# Patient Record
Sex: FEMALE | Race: WHITE | Employment: UNEMPLOYED | URBAN - METROPOLITAN AREA
[De-identification: names, ages, dates, MRNs, and addresses within clinical notes are randomized per-mention and may not be internally consistent; named-entity substitution may affect disease eponyms.]

---

## 2017-01-13 RX ORDER — FLUCONAZOLE 150 MG/1
150 TABLET ORAL DAILY
Qty: 2 TAB | Refills: 0 | Status: SHIPPED | OUTPATIENT
Start: 2017-01-13 | End: 2017-01-14

## 2017-02-08 ENCOUNTER — OFFICE VISIT (OUTPATIENT)
Dept: FAMILY MEDICINE CLINIC | Age: 42
End: 2017-02-08

## 2017-02-08 VITALS
RESPIRATION RATE: 16 BRPM | HEIGHT: 62 IN | BODY MASS INDEX: 22.08 KG/M2 | SYSTOLIC BLOOD PRESSURE: 140 MMHG | WEIGHT: 120 LBS | HEART RATE: 86 BPM | DIASTOLIC BLOOD PRESSURE: 88 MMHG | OXYGEN SATURATION: 97 % | TEMPERATURE: 98.6 F

## 2017-02-08 DIAGNOSIS — E03.4 HYPOTHYROIDISM DUE TO ACQUIRED ATROPHY OF THYROID: ICD-10-CM

## 2017-02-08 DIAGNOSIS — I10 ESSENTIAL HYPERTENSION: Primary | ICD-10-CM

## 2017-02-08 DIAGNOSIS — Z51.81 ENCOUNTER FOR MEDICATION MONITORING: ICD-10-CM

## 2017-02-08 DIAGNOSIS — F51.01 PRIMARY INSOMNIA: ICD-10-CM

## 2017-02-08 DIAGNOSIS — Z72.0 TOBACCO USE: ICD-10-CM

## 2017-02-08 DIAGNOSIS — Z01.419 ENCOUNTER FOR CERVICAL PAP SMEAR WITH PELVIC EXAM: ICD-10-CM

## 2017-02-08 DIAGNOSIS — I10 ESSENTIAL HYPERTENSION: ICD-10-CM

## 2017-02-08 DIAGNOSIS — J44.9 CHRONIC OBSTRUCTIVE PULMONARY DISEASE, UNSPECIFIED COPD TYPE (HCC): ICD-10-CM

## 2017-02-08 RX ORDER — CEFDINIR 300 MG/1
CAPSULE ORAL
Refills: 0 | COMMUNITY
Start: 2016-11-01 | End: 2017-02-08 | Stop reason: ALTCHOICE

## 2017-02-08 RX ORDER — BENZONATATE 100 MG/1
100 CAPSULE ORAL
Qty: 90 CAP | Refills: 1 | Status: SHIPPED | OUTPATIENT
Start: 2017-02-08 | End: 2017-05-31 | Stop reason: SDUPTHER

## 2017-02-08 RX ORDER — ALPRAZOLAM 0.5 MG/1
0.5 TABLET ORAL
Qty: 30 TAB | Refills: 1 | Status: SHIPPED | OUTPATIENT
Start: 2017-02-08 | End: 2017-04-11 | Stop reason: SDUPTHER

## 2017-02-08 RX ORDER — HYDROCHLOROTHIAZIDE 25 MG/1
25 TABLET ORAL DAILY
Qty: 90 TAB | Refills: 3 | Status: SHIPPED | OUTPATIENT
Start: 2017-02-08 | End: 2017-02-08 | Stop reason: SDUPTHER

## 2017-02-08 RX ORDER — TIOTROPIUM BROMIDE INHALATION SPRAY 3.12 UG/1
SPRAY, METERED RESPIRATORY (INHALATION)
Refills: 5 | COMMUNITY
Start: 2017-01-18

## 2017-02-08 RX ORDER — PROMETHAZINE HYDROCHLORIDE 25 MG/1
25 TABLET ORAL
Qty: 30 TAB | Refills: 1 | Status: SHIPPED | OUTPATIENT
Start: 2017-02-08

## 2017-02-08 RX ORDER — HYDROCHLOROTHIAZIDE 25 MG/1
25 TABLET ORAL DAILY
Qty: 90 TAB | Refills: 3 | Status: SHIPPED | OUTPATIENT
Start: 2017-02-08 | End: 2017-08-01 | Stop reason: SDUPTHER

## 2017-02-08 NOTE — PROGRESS NOTES
HISTORY OF PRESENT ILLNESS  Cachorro Tan is a 38 y.o. female. HPI   For follow up on BP and COPD/asthma. Has wheezing and chronic cough. Still smoking. Has tried cutting back and still working on reducing amount. Seeing endo for PCOS and thyroid. Also being followed for abnormal liver enzymes levels. Just completed a 30 day alcohol rehab program in [place]. States that she still drinks an occasional glass of wine with dinner but knows when to stop. Having trouble sleeping since recnet separation from her . Smoking still quite a bit to 3 ppd. Seeing a pulmonologist now. Also seeing hematology for hemachromatosis and elevated LFTS. Cardiovascular Review:  The patient has hypertension. Diet and Lifestyle: generally follows a low fat low cholesterol diet, generally follows a low sodium diet  Home BP Monitoring: is not measured at home. Pertinent ROS: taking medications as instructed, no medication side effects noted, no TIA's, no chest pain on exertion, no dyspnea on exertion, no swelling of ankles. Asthma Review:  The patient is being seen for follow up of asthma and tobacco abuse, not currently in exacerbation. Asthma symptoms occur: daily. Wheezing when present is described as mild-moderate and easily relieved with rescue bronchodilator. Current limitations in activity from asthma: none. Number of days of school or work missed in the last month: 0. Frequency of use of quick-relief meds: daily. Regimen compliance: The patient reports adherence to this regimen. Patient does smoke cigarettes. Migraine headaches are overall improved. Only having headaches about 2-3 times in a month.       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 90 Cap 1    promethazine (PHENERGAN) 25 mg tablet Take 1 Tab by mouth every six (6) hours as needed for Nausea. 30 Tab 1    ALPRAZolam (XANAX) 0.5 mg tablet Take 1 Tab by mouth nightly as needed. Max Daily Amount: 0.5 mg. 30 Tab 1    traZODone (DESYREL) 50 mg tablet Take 1 Tab by mouth nightly. 30 Tab 1    albuterol (VENTOLIN HFA) 90 mcg/actuation inhaler TAKE 2 PUFFS BY MOUTH EVERY SIX (6) HOURS AS NEEDED FOR WHEEZING. 1 Inhaler 3    levothyroxine (SYNTHROID) 100 mcg tablet Take 100 mcg by mouth Daily (before breakfast).  metFORMIN ER (GLUCOPHAGE XR) 500 mg tablet Take 2 Tabs by mouth two (2) times a day. 30 Tab     ergocalciferol (VITAMIN D2) 50,000 unit capsule Take 50,000 Units by mouth every seven (7) days.  ibuprofen (ADVIL) 200 mg tablet Take 200 mg by mouth every six (6) hours as needed.  diphenhydrAMINE (BENADRYL) 25 mg capsule Take 25 mg by mouth as needed.  hydroCHLOROthiazide (HYDRODIURIL) 25 mg tablet Take 1 Tab by mouth daily.  90 Tab 3       Allergies   Allergen Reactions    Morphine Itching     Pt states she is not allergic to Morphine    Nsaids (Non-Steroidal Anti-Inflammatory Drug) Other (comments)     NOT SUPPOSED TO TAKE DUE TO NISSIN PROCEDURE    Sulfa (Sulfonamide Antibiotics) Unknown (comments)       Past Medical History   Diagnosis Date    Asthma      USES INHALER    Brain tumor ([facility] [address]      noticed at 30 y/o - told benign, followed with MRI every 3 years and not changing    Chronic obstructive pulmonary disease ([address] 10/2016    Contact dermatitis and other eczema, due to unspecified cause     Fatty liver     Hernia hiatal     Hyperinsulinemia     Hypertension      on meds    Ill-defined condition      hemochromatosis-has not been treated as of 2/19/16    Insomnia     Migraine headache, common     Nausea & vomiting     Other ill-defined conditions(799.89)      has hiatal hernia    Other ill-defined conditions(799.89)      has migraines/said she was told she has a brain tumor    Other ill-defined conditions(799.89) 8/27/13     RECENT SINUS INFECTION    PCOD (polycystic ovarian disease)     Thyroid disease      HYPO       Past Surgical History   Procedure Laterality Date    Pr esophagogastric fundoplasty  2003 and 2004     x 2    Hx tonsil and adenoidectomy      Pr musc/tendon repair each; arm/elbow  1983    Hx cyst removal  1988     throat    Pr breast surgery procedure unlisted       left lumpectomy    Hx other surgical       sebacous cyst removal left axilla    Pr abdomen surgery proc unlisted  2009      abdominal liposuction    Hx heent       T & A child    Hx heent       cyst on front of neck removed age15    Hx orthopaedic       left hand tendon repaired age 11   Nathalia Susanne Hx orthopaedic  2008     LEFT FOOT SURGERY-NERVE REMOVED    Hx gyn       benign tumor labia    Hx gi  2003     nissen fundoplication with revision in 2005       Family History   Problem Relation Age of Onset    Hypertension Mother    Nathalia Susanne Arthritis-rheumatoid Mother     Hypertension Father     Cancer Father      prostate    Cancer Maternal Grandfather      esophageal    Stroke Paternal Grandfather        Social History   Substance Use Topics    Smoking status: Current Every Day Smoker     Packs/day: 0.75     Years: 18.00    Smokeless tobacco: Never Used    Alcohol use Yes      Comment: 1 glass per week        Review of Systems   Constitutional: Negative for malaise/fatigue. HENT: Negative for congestion. Eyes: Negative for blurred vision. Respiratory: Positive for cough and wheezing. Negative for shortness of breath. Cardiovascular: Negative for chest pain, palpitations and leg swelling. Gastrointestinal: Negative for abdominal pain, constipation and heartburn. Genitourinary: Negative for dysuria, frequency and urgency. Musculoskeletal: Negative for back pain and joint pain. Neurological: Negative for dizziness, tingling and headaches. Endo/Heme/Allergies: Negative for environmental allergies. Psychiatric/Behavioral: Negative for depression. The patient has insomnia. Physical Exam   Constitutional: She appears well-developed and well-nourished. /88  Pulse 86  Temp 98.6 °F (37 °C) (Oral)   Resp 16  Ht 5' 2\" (1.575 m)  Wt 120 lb (54.4 kg)  LMP 02/01/2017  SpO2 97%  BMI 21.95 kg/m2     HENT:   Right Ear: Tympanic membrane and ear canal normal.   Left Ear: Tympanic membrane and ear canal normal.   Nose: No mucosal edema or rhinorrhea. Mouth/Throat: Oropharynx is clear and moist and mucous membranes are normal.   Neck: Normal range of motion. Neck supple. No thyromegaly present. Cardiovascular: Normal rate and regular rhythm. No murmur heard. Pulmonary/Chest: Effort normal. She has wheezes. She has rhonchi. Abdominal: Soft. Bowel sounds are normal. There is no tenderness. Musculoskeletal: Normal range of motion. She exhibits no edema. Lymphadenopathy:     She has no cervical adenopathy. Skin: Skin is warm and dry. Psychiatric: She has a normal mood and affect. Nursing note and vitals reviewed. ASSESSMENT and Elizabeth Valero was seen today for hypertension. Diagnoses and all orders for this visit:    Essential hypertension  -  Refill hydroCHLOROthiazide (HYDRODIURIL) 25 mg tablet; Take 1 Tab by mouth daily.  -     LIPID PANEL; Future    Chronic obstructive pulmonary disease, unspecified COPD type (HCC)//Asthma  -     benzonatate (TESSALON PERLES) 100 mg capsule;  Take 1 Cap by mouth three (3) times daily as needed for Cough. Hypothyroidism due to acquired atrophy of thyroid  As per endo    Primary insomnia  -     Refill ALPRAZolam (XANAX) 0.5 mg tablet; Take 1 Tab by mouth nightly as needed. Max Daily Amount: 0.5 mg. Pt was advise that she could not drink if using this medication to help charlie sleep. Encounter for medication monitoring  -     METABOLIC PANEL, COMPREHENSIVE; Future    Encounter for cervical Pap smear with pelvic exam  -     REFERRAL TO GYNECOLOGY    Tobacco use  The patient was counseled on the dangers of tobacco use, and was advised to quit. Reviewed strategies to maximize success, including removing cigarettes and smoking materials from environment, stress management, substitution of other forms of reinforcement. Other orders  -     promethazine (PHENERGAN) 25 mg tablet; Take 1 Tab by mouth every six (6) hours as needed for Nausea. Follow-up Disposition:  Return in about 5 months (around 7/8/2017). reviewed diet, exercise and weight control  very strongly urged to quit smoking to reduce cardiovascular risk  cardiovascular risk and specific lipid/LDL goals reviewed  reviewed medications and side effects in detail    I have discussed diagnosis listed in this note with pt and/or family. I have discussed treatment plans and options and the risk/benefit analysis of those options, including safe use of medications and possible medication side effects. Through the use of shared decision making we have agreed to the above plan. The patient has received an after-visit summary and questions were answered concerning future plans and follow up. Advise pt of any urgent changes then to proceed to the ER.

## 2017-02-08 NOTE — MR AVS SNAPSHOT
Visit Information Date & Time Provider Department Dept. Phone Encounter #  
 2/8/2017 11:00 AM Blayne Colunga [place] [phone] [id number] Follow-up Instructions Return in about 5 months (around 7/8/2017). Your Appointments 7/10/2017 11:00 AM  
ROUTINE CARE with Blayne Colunga MD  
[facility]) Appt Note: 5m f/u  
 [address] [facility] [address] [phone] [address] [place] [address] [address] Upcoming Health Maintenance Date Due  
 PAP AKA CERVICAL CYTOLOGY 7/15/2016 DTaP/Tdap/Td series (2 - Td) 8/9/2023 Allergies as of 2/8/2017  Review Complete On: 2/8/2017 By: Blayne Colunga MD  
  
 Severity Noted Reaction Type Reactions Morphine  02/19/2010    Itching Pt states she is not allergic to Morphine Nsaids (Non-steroidal Anti-inflammatory Drug)  09/12/2011    Other (comments) NOT SUPPOSED TO TAKE DUE TO NISSIN PROCEDURE Sulfa (Sulfonamide Antibiotics)  02/19/2010    Unknown (comments) Current Immunizations  Reviewed on 2/8/2017 Name Date  
 TB Skin Test (PPD) Intradermal 9/4/2013 Tdap 8/9/2013 Reviewed by Blayne Colunga MD on 2/8/2017 at 11:22 AM  
You Were Diagnosed With   
  
 Codes Comments Essential hypertension    -  Primary ICD-10-CM: I10 
ICD-9-CM: 397. 9 Wheezing     ICD-10-CM: R06.2 ICD-9-CM: 786.07 Hypothyroidism due to acquired atrophy of thyroid     ICD-10-CM: E03.4 ICD-9-CM: 244.8, 246.8 Tobacco use     ICD-10-CM: Z72.0 ICD-9-CM: 305.1 Encounter for medication monitoring     ICD-10-CM: Z51.81 
ICD-9-CM: V58.83 Encounter for cervical Pap smear with pelvic exam     ICD-10-CM: U98.710 ICD-9-CM: V76.2, V72.31 Primary insomnia     ICD-10-CM: F51.01 
ICD-9-CM: 307.42 Vitals BP Pulse Temp Resp Height(growth percentile) Weight(growth percentile) 140/88 86 98.6 °F (37 °C) (Oral) 16 5' 2\" (1.575 m) 120 lb (54.4 kg) LMP SpO2 BMI OB Status Smoking Status 02/01/2017 97% 21.95 kg/m2 Having regular periods Current Every Day Smoker Vitals History BMI and BSA Data Body Mass Index Body Surface Area  
 21.95 kg/m 2 1.54 m 2 Preferred Pharmacy Pharmacy Name Phone [facility][id number]- [address], [address] [phone] Your Updated Medication List  
  
   
This list is accurate as of: 2/8/17  6:11 PM.  Always use your most recent med list. ADVIL 200 mg tablet Generic drug:  ibuprofen Take 200 mg by mouth every six (6) hours as needed. albuterol 90 mcg/actuation inhaler Commonly known as:  VENTOLIN HFA  
TAKE 2 PUFFS BY MOUTH EVERY SIX (6) HOURS AS NEEDED FOR WHEEZING. ALPRAZolam 0.5 mg tablet Commonly known as:  Carletha Puller Take 1 Tab by mouth nightly as needed. Max Daily Amount: 0.5 mg. BENADRYL 25 mg capsule Generic drug:  diphenhydrAMINE Take 25 mg by mouth as needed. benzonatate 100 mg capsule Commonly known as:  TESSALON PERLES Take 1 Cap by mouth three (3) times daily as needed for Cough. hydroCHLOROthiazide 25 mg tablet Commonly known as:  HYDRODIURIL Take 1 Tab by mouth daily. metFORMIN  mg tablet Commonly known as:  GLUCOPHAGE XR Take 2 Tabs by mouth two (2) times a day. promethazine 25 mg tablet Commonly known as:  PHENERGAN Take 1 Tab by mouth every six (6) hours as needed for Nausea. SPIRIVA RESPIMAT 2.5 mcg/actuation inhaler Generic drug:  tiotropium bromide INHALE 2 PUFFS DAILY  
  
 SYNTHROID 100 mcg tablet Generic drug:  levothyroxine Take 100 mcg by mouth Daily (before breakfast). traZODone 50 mg tablet Commonly known as:  Tyesha Quach Take 1 Tab by mouth nightly. VITAMIN D2 50,000 unit capsule Generic drug:  ergocalciferol Take 50,000 Units by mouth every seven (7) days. Prescriptions Printed Refills  
 promethazine (PHENERGAN) 25 mg tablet 1 Sig: Take 1 Tab by mouth every six (6) hours as needed for Nausea. Class: Print Route: Oral  
 ALPRAZolam (XANAX) 0.5 mg tablet 1 Sig: Take 1 Tab by mouth nightly as needed. Max Daily Amount: 0.5 mg.  
 Class: Print Route: Oral  
  
Prescriptions Sent to Pharmacy Refills  
 hydroCHLOROthiazide (HYDRODIURIL) 25 mg tablet (Discontinued) 3 Sig: Take 1 Tab by mouth daily. Class: Normal  
 Pharmacy: [address] [place] [address] Ph #: [phone] Route: Oral  
 Reason for Discontinue: Reorder  
 benzonatate (TESSALON PERLES) 100 mg capsule 1 Sig: Take 1 Cap by mouth three (3) times daily as needed for Cough. Class: Normal  
 Pharmacy: [address] [place] [address] Ph #: [phone] Route: Oral  
  
We Performed the Following REFERRAL TO GYNECOLOGY [REF30 Custom] Comments:  
 . Follow-up Instructions Return in about 5 months (around 7/8/2017). To-Do List   
 02/08/2017 Lab:  LIPID PANEL   
  
 02/08/2017 Lab:  METABOLIC PANEL, COMPREHENSIVE Referral Information Referral ID Referred By Referred To  
  
 [id number] [address] Meg Guzman MD   
   [address] [place] [address] Phone: [phone] Fax: [phone] Visits Status Start Date End Date 1 New Request 2/8/17 2/8/18 If your referral has a status of pending review or denied, additional information will be sent to support the outcome of this decision. Patient Instructions Breast Self-Exam: Care Instructions Your Care Instructions A breast self-exam is when you check your breasts for lumps or changes. This regular exam helps you learn how your breasts normally look and feel. Most breast problems or changes are not because of cancer. Breast self-exam is not a substitute for a mammogram. Having regular breast exams by your doctor and regular mammograms improve your chances of finding any problems with your breasts. Some women set a time each month to do a step-by-step breast self-exam. Other women like a less formal system. They might look at their breasts as they brush their teeth, or feel their breasts once in a while in the shower. If you notice a change in your breast, tell your doctor. Follow-up care is a key part of your treatment and safety. Be sure to make and go to all appointments, and call your doctor if you are having problems. Its also a good idea to know your test results and keep a list of the medicines you take. How do you do a breast self-exam? 
· The best time to examine your breasts is usually one week after your menstrual period begins. Your breasts should not be tender then. If you do not have periods, you might do your exam on a day of the month that is easy to remember. · To examine your breasts: ¨ Remove all your clothes above the waist and lie down. When you are lying down, your breast tissue spreads evenly over your chest wall, which makes it easier to feel all your breast tissue. ¨ Use the padsnot the fingertipsof the 3 middle fingers of your left hand to check your right breast. Move your fingers slowly in small coin-sized circles that overlap. ¨ Use three levels of pressure to feel of all your breast tissue. Use light pressure to feel the tissue close to the skin surface. Use medium pressure to feel a little deeper. Use firm pressure to feel your tissue close to your breastbone and ribs. Use each pressure level to feel your breast tissue before moving on to the next spot. ¨ Check your entire breast, moving up and down as if following a strip from the collarbone to the bra line, and from the armpit to the ribs.  Repeat until you have covered the entire breast. 
 ¨ Repeat this procedure for your left breast, using the pads of the 3 middle fingers of your right hand. · To examine your breasts while in the shower: 
¨ Place one arm over your head and lightly soap your breast on that side. ¨ Using the pads of your fingers, gently move your hand over your breast (in the strip pattern described above), feeling carefully for any lumps or changes. ¨ Repeat for the other breast. 
· Have your doctor inspect anything you notice to see if you need further testing. Where can you learn more? Go to http://annette-anish.info/. Enter P148 in the search box to learn more about \"Breast Self-Exam: Care Instructions. \" Current as of: July 26, 2016 Content Version: 11.1 © 7543-1016 [employer]orporated. Care instructions adapted under license by [employer] (which disclaims liability or warranty for this information). If you have questions about a medical condition or this instruction, always ask your healthcare professional. [place] [address] any warranty or liability for your use of this information. Introducing [facility] HE[facility]! Patricia Allen introduces MeMeMe patient portal. Now you can access parts of your medical record, email your doctor's office, and request medication refills online. 1. In your internet browser, go to [email or web address] 2. Click on the First Time User? Click Here link in the Sign In box. You will see the New Member Sign Up page. 3. Enter your MeMeMe Access Code exactly as it appears below. You will not need to use this code after youve completed the sign-up process. If you do not sign up before the expiration date, you must request a new code. · MeMeMe Access Code: [email or web address] Expires: 5/9/2017 11:51 AM 
 
4. Enter the last four digits of your Social Security Number (xxxx) and Date of Birth (mm/dd/yyyy) as indicated and click Submit.  You will be taken to the next sign-up page. 5. Create a [employer] ID. This will be your [employer] login ID and cannot be changed, so think of one that is secure and easy to remember. 6. Create a [employer] password. You can change your password at any time. 7. Enter your Password Reset Question and Answer. This can be used at a later time if you forget your password. 8. Enter your e-mail address. You will receive e-mail notification when new information is available in [address]. 9. Click Sign Up. You can now view and download portions of your medical record. 10. Click the Download Summary menu link to download a portable copy of your medical information. If you have questions, please visit the Frequently Asked Questions section of the [employer] website. Remember, [employer] is NOT to be used for urgent needs. For medical emergencies, dial 911. Now available from your iPhone and Android! Please provide this summary of care documentation to your next provider. Your primary care clinician is listed as Peter Leroy. If you have any questions after today's visit, please call [phone].

## 2017-02-08 NOTE — PATIENT INSTRUCTIONS
Breast Self-Exam: Care Instructions  Your Care Instructions  A breast self-exam is when you check your breasts for lumps or changes. This regular exam helps you learn how your breasts normally look and feel. Most breast problems or changes are not because of cancer. Breast self-exam is not a substitute for a mammogram. Having regular breast exams by your doctor and regular mammograms improve your chances of finding any problems with your breasts. Some women set a time each month to do a step-by-step breast self-exam. Other women like a less formal system. They might look at their breasts as they brush their teeth, or feel their breasts once in a while in the shower. If you notice a change in your breast, tell your doctor. Follow-up care is a key part of your treatment and safety. Be sure to make and go to all appointments, and call your doctor if you are having problems. Its also a good idea to know your test results and keep a list of the medicines you take. How do you do a breast self-exam?  · The best time to examine your breasts is usually one week after your menstrual period begins. Your breasts should not be tender then. If you do not have periods, you might do your exam on a day of the month that is easy to remember. · To examine your breasts:  ¨ Remove all your clothes above the waist and lie down. When you are lying down, your breast tissue spreads evenly over your chest wall, which makes it easier to feel all your breast tissue. ¨ Use the padsnot the fingertipsof the 3 middle fingers of your left hand to check your right breast. Move your fingers slowly in small coin-sized circles that overlap. ¨ Use three levels of pressure to feel of all your breast tissue. Use light pressure to feel the tissue close to the skin surface. Use medium pressure to feel a little deeper. Use firm pressure to feel your tissue close to your breastbone and ribs.  Use each pressure level to feel your breast tissue before moving on to the next spot. ¨ Check your entire breast, moving up and down as if following a strip from the collarbone to the bra line, and from the armpit to the ribs. Repeat until you have covered the entire breast.  ¨ Repeat this procedure for your left breast, using the pads of the 3 middle fingers of your right hand. · To examine your breasts while in the shower:  ¨ Place one arm over your head and lightly soap your breast on that side. ¨ Using the pads of your fingers, gently move your hand over your breast (in the strip pattern described above), feeling carefully for any lumps or changes. ¨ Repeat for the other breast.  · Have your doctor inspect anything you notice to see if you need further testing. Where can you learn more? Go to http://annette-anish.info/. Enter P148 in the search box to learn more about \"Breast Self-Exam: Care Instructions. \"  Current as of: July 26, 2016  Content Version: 11.1  © 6581-1289 [employer]. Care instructions adapted under license by [employer] (which disclaims liability or warranty for this information). If you have questions about a medical condition or this instruction, always ask your healthcare professional. [place] [address] any warranty or liability for your use of this information.

## 2017-02-27 ENCOUNTER — HOSPITAL ENCOUNTER (OUTPATIENT)
Dept: CT IMAGING | Age: 42
Discharge: HOME | End: 2017-02-27
Attending: INTERNAL MEDICINE
Payer: COMMERCIAL

## 2017-02-27 DIAGNOSIS — R93.89 ABNORMAL RADIOLOGICAL FINDINGS IN SKIN AND SUBCUTANEOUS TISSUE: ICD-10-CM

## 2017-02-27 PROCEDURE — 71250 CT THORAX DX C-: CPT

## 2017-03-16 ENCOUNTER — DOCUMENTATION ONLY (OUTPATIENT)
Dept: FAMILY MEDICINE CLINIC | Age: 42
End: 2017-03-16

## 2017-04-11 DIAGNOSIS — F51.01 PRIMARY INSOMNIA: ICD-10-CM

## 2017-04-14 RX ORDER — ALPRAZOLAM 0.5 MG/1
0.5 TABLET ORAL
Qty: 30 TAB | Refills: 1 | OUTPATIENT
Start: 2017-04-14 | End: 2017-07-05 | Stop reason: SDUPTHER

## 2017-05-31 DIAGNOSIS — J44.9 CHRONIC OBSTRUCTIVE PULMONARY DISEASE, UNSPECIFIED COPD TYPE (HCC): ICD-10-CM

## 2017-05-31 RX ORDER — BENZONATATE 100 MG/1
100 CAPSULE ORAL
Qty: 90 CAP | Refills: 1 | Status: SHIPPED | OUTPATIENT
Start: 2017-05-31

## 2017-07-05 DIAGNOSIS — F51.01 PRIMARY INSOMNIA: ICD-10-CM

## 2017-07-05 RX ORDER — ALPRAZOLAM 0.5 MG/1
0.5 TABLET ORAL
Qty: 30 TAB | Refills: 0 | OUTPATIENT
Start: 2017-07-05 | End: 2017-08-01 | Stop reason: SDUPTHER

## 2017-07-07 ENCOUNTER — DOCUMENTATION ONLY (OUTPATIENT)
Dept: FAMILY MEDICINE CLINIC | Age: 42
End: 2017-07-07

## 2017-07-07 NOTE — PROGRESS NOTES
Alprazolam 0.5 mg tablet called to [facility] pharmacy #[id number] [place], [place]. Disp 30 refill zero. Take one by mouth nightly as needed. Authorized by Dr. Jose Downing.

## 2017-07-11 ENCOUNTER — OFFICE VISIT (OUTPATIENT)
Dept: FAMILY MEDICINE CLINIC | Age: 42
End: 2017-07-11

## 2017-07-11 NOTE — MR AVS SNAPSHOT
Visit Information Date & Time Provider Department Dept. Phone Encounter #  
 7/11/2017 11:00 AM Reyes Todd [phone] [id number] Your Appointments 8/1/2017 11:45 AM  
ROUTINE CARE with Dinora Leiva MD  
[facility] [address]) Appt Note: follow up  
 [address] [place] [address] [phone] [address] Upcoming Health Maintenance Date Due  
 PAP AKA CERVICAL CYTOLOGY 7/15/2016 INFLUENZA AGE 9 TO ADULT 8/1/2017 DTaP/Tdap/Td series (2 - Td) 8/9/2023 Allergies as of 7/11/2017  Review Complete On: 7/11/2017 By: Dinora Leiva MD  
  
 Severity Noted Reaction Type Reactions Morphine  02/19/2010    Itching Pt states she is not allergic to Morphine Nsaids (Non-steroidal Anti-inflammatory Drug)  09/12/2011    Other (comments) NOT SUPPOSED TO TAKE DUE TO NISSIN PROCEDURE Sulfa (Sulfonamide Antibiotics)  02/19/2010    Unknown (comments) Current Immunizations  Reviewed on 2/8/2017 Name Date  
 TB Skin Test (PPD) Intradermal 9/4/2013 Tdap 8/9/2013 Not reviewed this visit Vitals OB Status Smoking Status Having regular periods Current Every Day Smoker Preferred Pharmacy Pharmacy Name Phone [id number], [address] [phone] Your Updated Medication List  
  
   
This list is accurate as of: 7/11/17  6:29 PM.  Always use your most recent med list. ADVIL 200 mg tablet Generic drug:  ibuprofen Take 200 mg by mouth every six (6) hours as needed. albuterol 90 mcg/actuation inhaler Commonly known as:  VENTOLIN HFA  
TAKE 2 PUFFS BY MOUTH EVERY SIX (6) HOURS AS NEEDED FOR WHEEZING. ALPRAZolam 0.5 mg tablet Commonly known as:  Pearl Ibarra Take 1 Tab by mouth nightly as needed. Max Daily Amount: 0.5 mg. BENADRYL 25 mg capsule Generic drug:  diphenhydrAMINE Take 25 mg by mouth as needed. benzonatate 100 mg capsule Commonly known as:  TESSALON PERLES Take 1 Cap by mouth three (3) times daily as needed for Cough. hydroCHLOROthiazide 25 mg tablet Commonly known as:  HYDRODIURIL Take 1 Tab by mouth daily. metFORMIN  mg tablet Commonly known as:  GLUCOPHAGE XR Take 2 Tabs by mouth two (2) times a day. promethazine 25 mg tablet Commonly known as:  PHENERGAN Take 1 Tab by mouth every six (6) hours as needed for Nausea. SPIRIVA RESPIMAT 2.5 mcg/actuation inhaler Generic drug:  tiotropium bromide INHALE 2 PUFFS DAILY  
  
 SYNTHROID 100 mcg tablet Generic drug:  levothyroxine Take 100 mcg by mouth Daily (before breakfast). traZODone 50 mg tablet Commonly known as:  Georganna Quiver Take 1 Tab by mouth nightly. VITAMIN D2 50,000 unit capsule Generic drug:  ergocalciferol Take 50,000 Units by mouth every seven (7) days. Introducing [facility]! Kris Wade introduces Demeure patient portal. Now you can access parts of your medical record, email your doctor's office, and request medication refills online. 1. In your internet browser, go to [email or web address] 2. Click on the First Time User? Click Here link in the Sign In box. You will see the New Member Sign Up page. 3. Enter your Demeure Access Code exactly as it appears below. You will not need to use this code after youve completed the sign-up process. If you do not sign up before the expiration date, you must request a new code. · Demeure Access Code: [email or web address] Expires: 10/9/2017  6:29 PM 
 
4. Enter the last four digits of your Social Security Number (xxxx) and Date of Birth (mm/dd/yyyy) as indicated and click Submit. You will be taken to the next sign-up page. 5. Create a Ovelin ID. This will be your Ovelin login ID and cannot be changed, so think of one that is secure and easy to remember. 6. Create a Ovelin password. You can change your password at any time. 7. Enter your Password Reset Question and Answer. This can be used at a later time if you forget your password. 8. Enter your e-mail address. You will receive e-mail notification when new information is available in [address] 9. Click Sign Up. You can now view and download portions of your medical record. 10. Click the Download Summary menu link to download a portable copy of your medical information. If you have questions, please visit the Frequently Asked Questions section of the Ovelin website. Remember, Ovelin is NOT to be used for urgent needs. For medical emergencies, dial 911. Now available from your iPhone and Android! Please provide this summary of care documentation to your next provider. Your primary care clinician is listed as Nate Greene. If you have any questions after today's visit, please call [phone].

## 2017-07-20 LAB — CREATININE, EXTERNAL: 1.08

## 2017-08-01 ENCOUNTER — OFFICE VISIT (OUTPATIENT)
Dept: FAMILY MEDICINE CLINIC | Age: 42
End: 2017-08-01

## 2017-08-01 VITALS
DIASTOLIC BLOOD PRESSURE: 101 MMHG | SYSTOLIC BLOOD PRESSURE: 163 MMHG | OXYGEN SATURATION: 99 % | BODY MASS INDEX: 22.86 KG/M2 | TEMPERATURE: 98.5 F | HEIGHT: 62 IN | WEIGHT: 124.2 LBS | HEART RATE: 100 BPM | RESPIRATION RATE: 16 BRPM

## 2017-08-01 DIAGNOSIS — F51.01 PRIMARY INSOMNIA: ICD-10-CM

## 2017-08-01 DIAGNOSIS — E03.4 HYPOTHYROIDISM DUE TO ACQUIRED ATROPHY OF THYROID: ICD-10-CM

## 2017-08-01 DIAGNOSIS — F10.11 H/O ALCOHOL ABUSE: ICD-10-CM

## 2017-08-01 DIAGNOSIS — Z51.81 ENCOUNTER FOR MEDICATION MONITORING: ICD-10-CM

## 2017-08-01 DIAGNOSIS — I10 ESSENTIAL HYPERTENSION: Primary | ICD-10-CM

## 2017-08-01 DIAGNOSIS — Z72.0 TOBACCO ABUSE: ICD-10-CM

## 2017-08-01 DIAGNOSIS — J45.40 RAD (REACTIVE AIRWAY DISEASE), MODERATE PERSISTENT, UNCOMPLICATED: ICD-10-CM

## 2017-08-01 PROBLEM — J45.909 RAD (REACTIVE AIRWAY DISEASE): Status: ACTIVE | Noted: 2017-08-01

## 2017-08-01 RX ORDER — LISINOPRIL 20 MG/1
20 TABLET ORAL DAILY
Qty: 90 TAB | Refills: 1 | Status: SHIPPED | OUTPATIENT
Start: 2017-08-01 | End: 2018-02-01 | Stop reason: SDUPTHER

## 2017-08-01 RX ORDER — TRAZODONE HYDROCHLORIDE 50 MG/1
50 TABLET ORAL
Qty: 30 TAB | Refills: 3 | Status: SHIPPED | OUTPATIENT
Start: 2017-08-01 | End: 2018-01-18 | Stop reason: SDUPTHER

## 2017-08-01 RX ORDER — HYDROCHLOROTHIAZIDE 25 MG/1
25 TABLET ORAL DAILY
Qty: 90 TAB | Refills: 3 | Status: SHIPPED | OUTPATIENT
Start: 2017-08-01 | End: 2018-08-03 | Stop reason: SDUPTHER

## 2017-08-01 RX ORDER — ALPRAZOLAM 0.5 MG/1
0.5 TABLET ORAL
Qty: 30 TAB | Refills: 3 | Status: SHIPPED | OUTPATIENT
Start: 2017-08-01 | End: 2018-01-18 | Stop reason: SDUPTHER

## 2017-08-01 NOTE — PROGRESS NOTES
HISTORY OF PRESENT ILLNESS  Vance Rodarte is a 38 y.o. female. HPI   For follow up on BP and COPD/asthma. Has wheezing and chronic cough. Still smoking 2-3ppd. Seeing endo for PCOS and thyroid. Also being followed for abnormal liver enzymes levels. Completed a 30 day alcohol rehab program in [place] but she has been drinking again due to stress. States that she is not drinking everyday. States that she knows when to stop. Having trouble sleeping since recent separation from her . Seeing a pulmonologist now. Also seeing hematology for hemachromatosis and elevated LFTS. Cardiovascular Review:  The patient has hypertension. Diet and Lifestyle: generally follows a low fat low cholesterol diet, generally follows a low sodium diet  Home BP Monitoring: is not measured at home. Pertinent ROS: taking medications as instructed, no medication side effects noted, no TIA's, no chest pain on exertion, no dyspnea on exertion, no swelling of ankles. Asthma Review:  The patient is being seen for follow up of asthma/COPD and tobacco abuse, not currently in exacerbation. Seeing pulmonologist.    Asthma symptoms occur: daily. Wheezing when present is described as mild-moderate and easily relieved with rescue bronchodilator. Current limitations in activity from asthma: none. Number of days of school or work missed in the last month: 0. Frequency of use of quick-relief meds: daily. Regimen compliance: The patient reports adherence to this regimen. Patient does smoke cigarettes. Migraine headaches are overall improved. Only having headaches about 2-3 times in a month.       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 Max Daily Amount: 0.5 mg. 30 Tab 3    traZODone (DESYREL) 50 mg tablet Take 1 Tab by mouth nightly. 30 Tab 3    lisinopril (PRINIVIL, ZESTRIL) 20 mg tablet Take 1 Tab by mouth daily. 90 Tab 1    hydroCHLOROthiazide (HYDRODIURIL) 25 mg tablet Take 1 Tab by mouth daily. 90 Tab 3    benzonatate (TESSALON PERLES) 100 mg capsule Take 1 Cap by mouth three (3) times daily as needed for Cough. 90 Cap 1    SPIRIVA RESPIMAT 2.5 mcg/actuation inhaler INHALE 2 PUFFS DAILY  5    promethazine (PHENERGAN) 25 mg tablet Take 1 Tab by mouth every six (6) hours as needed for Nausea. 30 Tab 1    albuterol (VENTOLIN HFA) 90 mcg/actuation inhaler TAKE 2 PUFFS BY MOUTH EVERY SIX (6) HOURS AS NEEDED FOR WHEEZING. 1 Inhaler 3    levothyroxine (SYNTHROID) 100 mcg tablet Take 100 mcg by mouth Daily (before breakfast).  metFORMIN ER (GLUCOPHAGE XR) 500 mg tablet Take 2 Tabs by mouth two (2) times a day. 30 Tab     ibuprofen (ADVIL) 200 mg tablet Take 200 mg by mouth every six (6) hours as needed.  diphenhydrAMINE (BENADRYL) 25 mg capsule Take 25 mg by mouth as needed.          Allergies   Allergen Reactions    Morphine Itching     Pt states she is not allergic to Morphine    Nsaids (Non-Steroidal Anti-Inflammatory Drug) Other (comments)     NOT SUPPOSED TO TAKE DUE TO NISSIN PROCEDURE    Sulfa (Sulfonamide Antibiotics) Unknown (comments)         Past Medical History:   Diagnosis Date    Asthma     USES INHALER    Brain tumor ([facility]     noticed at 30 y/o - told benign, followed with MRI every 3 years and not changing    Chronic obstructive pulmonary disease ([facility] 10/2016    Contact dermatitis and other eczema, due to unspecified cause     Fatty liver     Hernia hiatal     Hyperinsulinemia     Hypertension     on meds    Ill-defined condition     hemochromatosis-has not been treated as of 2/19/16    Insomnia     Migraine headache, common     Nausea & vomiting     Other ill-defined conditions     has hiatal hernia    Other ill-defined conditions     has migraines/said she was told she has a brain tumor    Other ill-defined conditions 8/27/13    RECENT SINUS INFECTION    PCOD (polycystic ovarian disease)     Thyroid disease     HYPO         Past Surgical History:   Procedure Laterality Date    ABDOMEN SURGERY PROC UNLISTED  2009     abdominal liposuction    BREAST SURGERY PROCEDURE UNLISTED      left lumpectomy    HX CYST REMOVAL  1988    throat    HX GI  2003    nissen fundoplication with revision in 2005    HX GYN      benign tumor labia    HX HEENT      T & A child    HX HEENT      cyst on front of neck removed age15    HX ORTHOPAEDIC      left hand tendon repaired age 11   [de-identified] ORTHOPAEDIC  2008    LEFT FOOT SURGERY-NERVE REMOVED    HX OTHER SURGICAL      sebacous cyst removal left axilla    HX TONSIL AND ADENOIDECTOMY      MUSC/TENDON REPAIR EACH; ARM/ELBOW  1983    AK ESOPHAGOGASTRIC FUNDOPLASTY  2003 and 2004    x 2         Family History   Problem Relation Age of Onset    Hypertension Mother    Catha Sprout Arthritis-rheumatoid Mother     Hypertension Father     Cancer Father      prostate    Cancer Maternal Grandfather      esophageal    Stroke Paternal Grandfather        Social History   Substance Use Topics    Smoking status: Current Every Day Smoker     Packs/day: 0.75     Years: 18.00    Smokeless tobacco: Never Used    Alcohol use Yes      Comment: 1 glass per week        Review of Systems   Constitutional: Negative for malaise/fatigue. HENT: Negative for congestion. Eyes: Negative for blurred vision. Respiratory: Positive for cough and wheezing. Negative for shortness of breath. Cardiovascular: Negative for chest pain, palpitations and leg swelling. Gastrointestinal: Negative for abdominal pain, constipation and heartburn. Genitourinary: Negative for dysuria, frequency and urgency. Musculoskeletal: Negative for back pain and joint pain. Neurological: Negative for dizziness, tingling and headaches. Endo/Heme/Allergies: Negative for environmental allergies. Psychiatric/Behavioral: Negative for depression. The patient has insomnia. Physical Exam   Constitutional: She appears well-developed and well-nourished. /88  Pulse 86  Temp 98.6 °F (37 °C) (Oral)   Resp 16  Ht 5' 2\" (1.575 m)  Wt 120 lb (54.4 kg)  LMP 02/01/2017  SpO2 97%  BMI 21.95 kg/m2     HENT:   Right Ear: Tympanic membrane and ear canal normal.   Left Ear: Tympanic membrane and ear canal normal.   Nose: No mucosal edema or rhinorrhea. Mouth/Throat: Oropharynx is clear and moist and mucous membranes are normal.   Neck: Normal range of motion. Neck supple. No thyromegaly present. Cardiovascular: Normal rate and regular rhythm. No murmur heard. Pulmonary/Chest: Effort normal. She has wheezes. She has rhonchi. Abdominal: Soft. Bowel sounds are normal. There is no tenderness. Musculoskeletal: Normal range of motion. She exhibits no edema. Lymphadenopathy:     She has no cervical adenopathy. Skin: Skin is warm and dry. Psychiatric: She has a normal mood and affect. Nursing note and vitals reviewed. ASSESSMENT and PLAN  Diagnoses and all orders for this visit:    1. Essential hypertension  Uncontrolled. Discussed sodium restriction, high k rich diet, maintaining ideal body weight and regular exercise program such as daily walking 30 min perday 4-5 times per week, as physiologic means to achieve blood pressure control.  Medication compliance advised. Discussed complications d/t uncontrolled HTN.     -     Add lisinopril (PRINIVIL, ZESTRIL) 20 mg tablet; Take 1 Tab by mouth daily. -     hydroCHLOROthiazide (HYDRODIURIL) 25 mg tablet; Take 1 Tab by mouth daily. 2. Primary insomnia  -     Refill ALPRAZolam (XANAX) 0.5 mg tablet; Take 1 Tab by mouth nightly as needed. Max Daily Amount: 0.5 mg.  -    Refill traZODone (DESYREL) 50 mg tablet; Take 1 Tab by mouth nightly. 3. H/O alcohol abuse  Discussed ETOH use. States that she will cut back on the drinking. Declined any treatment follow up and does not want to go to AA    4. Tobacco abuse  The patient was counseled on the dangers of tobacco use, and was advised to quit. 5. RAD (reactive airway disease), moderate persistent, uncomplicated//COPD  As per pulmonary. 6. Hypothyroidism due to acquired atrophy of thyroid  As per endo    7. Encounter for medication monitoring      Follow-up Disposition:  Return in about 6 weeks (around 9/12/2017). reviewed diet, exercise and weight control  cardiovascular risk and specific lipid/LDL goals reviewed  reviewed medications and side effects in detail    I have discussed diagnosis listed in this note with pt and/or family. I have discussed treatment plans and options and the risk/benefit analysis of those options, including safe use of medications and possible medication side effects. Through the use of shared decision making we have agreed to the above plan. The patient has received an after-visit summary and questions were answered concerning future plans and follow up. Advise pt of any urgent changes then to proceed to the ER.

## 2017-08-01 NOTE — MR AVS SNAPSHOT
Visit Information Date & Time Provider Department Dept. Phone Encounter #  
 8/1/2017 11:45 AM Olesya Sanchez MD [facility] [phone] [id number] Follow-up Instructions Return in about 6 weeks (around 9/12/2017). Upcoming Health Maintenance Date Due  
 PAP AKA CERVICAL CYTOLOGY 7/15/2016 DTaP/Tdap/Td series (2 - Td) 8/9/2023 Allergies as of 8/1/2017  Review Complete On: 8/1/2017 By: Olesya Sanchez MD  
  
 Severity Noted Reaction Type Reactions Morphine  02/19/2010    Itching Pt states she is not allergic to Morphine Nsaids (Non-steroidal Anti-inflammatory Drug)  09/12/2011    Other (comments) NOT SUPPOSED TO TAKE DUE TO NISSIN PROCEDURE Sulfa (Sulfonamide Antibiotics)  02/19/2010    Unknown (comments) Current Immunizations  Reviewed on 8/1/2017 Name Date  
 TB Skin Test (PPD) Intradermal 9/4/2013 Tdap 8/9/2013 Reviewed by Olesya Sanchez MD on 8/1/2017 at 12:01 PM  
You Were Diagnosed With   
  
 Codes Comments Essential hypertension     ICD-10-CM: I10 
ICD-9-CM: 401.9 Primary insomnia     ICD-10-CM: F51.01 
ICD-9-CM: 307.42 Insomnia, unspecified type     ICD-10-CM: G47.00 ICD-9-CM: 780.52 Vitals BP Pulse Temp Resp Height(growth percentile) Weight(growth percentile) (!) 163/101 (BP 1 Location: Left arm, BP Patient Position: Sitting) 100 98.5 °F (36.9 °C) (Oral) 16 5' 2\" (1.575 m) 124 lb 3.2 oz (56.3 kg) LMP SpO2 BMI OB Status Smoking Status 07/24/2017 99% 22.72 kg/m2 Having regular periods Current Every Day Smoker Vitals History BMI and BSA Data Body Mass Index Body Surface Area  
 22.72 kg/m 2 1.57 m 2 Preferred Pharmacy Pharmacy Name Phone [id number] [phone], [address] [phone] Your Updated Medication List  
  
   
This list is accurate as of: 8/1/17 12:11 PM.  Always use your most recent med list.  
  
 ADVIL 200 mg tablet Generic drug:  ibuprofen Take 200 mg by mouth every six (6) hours as needed. albuterol 90 mcg/actuation inhaler Commonly known as:  VENTOLIN HFA  
TAKE 2 PUFFS BY MOUTH EVERY SIX (6) HOURS AS NEEDED FOR WHEEZING. ALPRAZolam 0.5 mg tablet Commonly known as:  Amarilis Hopes Take 1 Tab by mouth nightly as needed. Max Daily Amount: 0.5 mg. BENADRYL 25 mg capsule Generic drug:  diphenhydrAMINE Take 25 mg by mouth as needed. benzonatate 100 mg capsule Commonly known as:  TESSALON PERLES Take 1 Cap by mouth three (3) times daily as needed for Cough. hydroCHLOROthiazide 25 mg tablet Commonly known as:  HYDRODIURIL Take 1 Tab by mouth daily. lisinopril 20 mg tablet Commonly known as:  Therisa Semen Take 1 Tab by mouth daily. metFORMIN  mg tablet Commonly known as:  GLUCOPHAGE XR Take 2 Tabs by mouth two (2) times a day. promethazine 25 mg tablet Commonly known as:  PHENERGAN Take 1 Tab by mouth every six (6) hours as needed for Nausea. SPIRIVA RESPIMAT 2.5 mcg/actuation inhaler Generic drug:  tiotropium bromide INHALE 2 PUFFS DAILY  
  
 SYNTHROID 100 mcg tablet Generic drug:  levothyroxine Take 100 mcg by mouth Daily (before breakfast). traZODone 50 mg tablet Commonly known as:  Sharona Runner Take 1 Tab by mouth nightly. VITAMIN D2 50,000 unit capsule Generic drug:  ergocalciferol Take 50,000 Units by mouth every seven (7) days. Prescriptions Printed Refills ALPRAZolam (XANAX) 0.5 mg tablet 3 Sig: Take 1 Tab by mouth nightly as needed. Max Daily Amount: 0.5 mg.  
 Class: Print Route: Oral  
  
Prescriptions Sent to Pharmacy Refills  
 traZODone (DESYREL) 50 mg tablet 3 Sig: Take 1 Tab by mouth nightly. Class: Normal  
 Pharmacy: [address] [place] [address] #: [phone]  Route: Oral  
 lisinopril (PRINIVIL, ZESTRIL) 20 mg tablet 1 Sig: Take 1 Tab by mouth daily. Class: Normal  
 Pharmacy: [address] [place] [address], [address] Ph #: [phone] Route: Oral  
 hydroCHLOROthiazide (HYDRODIURIL) 25 mg tablet 3 Sig: Take 1 Tab by mouth daily. Class: Normal  
 Pharmacy: [address] [place] [address], [address] [place] [address] Road Ph #: [phone] Route: Oral  
  
Follow-up Instructions Return in about 6 weeks (around 9/12/2017). Introducing [facility]! Dasilva Kent introduces Okan patient portal. Now you can access parts of your medical record, email your doctor's office, and request medication refills online. 1. In your internet browser, go to [email or web address] 2. Click on the First Time User? Click Here link in the Sign In box. You will see the New Member Sign Up page. 3. Enter your Okan Access Code exactly as it appears below. You will not need to use this code after youve completed the sign-up process. If you do not sign up before the expiration date, you must request a new code. · Okan Access Code: [email or web address] Expires: 10/9/2017  6:29 PM 
 
4. Enter the last four digits of your Social Security Number (xxxx) and Date of Birth (mm/dd/yyyy) as indicated and click Submit. You will be taken to the next sign-up page. 5. Create a Okan ID. This will be your Okan login ID and cannot be changed, so think of one that is secure and easy to remember. 6. Create a Okan password. You can change your password at any time. 7. Enter your Password Reset Question and Answer. This can be used at a later time if you forget your password. 8. Enter your e-mail address. You will receive e-mail notification when new information is available in [address] 9. Click Sign Up. You can now view and download portions of your medical record.  
10. Click the Download Summary menu link to download a portable copy of your medical information. If you have questions, please visit the Frequently Asked Questions section of the [employer] website. Remember, [employer] is NOT to be used for urgent needs. For medical emergencies, dial 911. Now available from your iPhone and Android! Please provide this summary of care documentation to your next provider. Your primary care clinician is listed as Liliana Russell. If you have any questions after today's visit, please call [phone].

## 2017-08-13 ENCOUNTER — APPOINTMENT (OUTPATIENT)
Dept: ULTRASOUND IMAGING | Age: 42
End: 2017-08-13
Attending: EMERGENCY MEDICINE
Payer: COMMERCIAL

## 2017-08-13 ENCOUNTER — HOSPITAL ENCOUNTER (EMERGENCY)
Age: 42
Discharge: HOME | End: 2017-08-13
Attending: EMERGENCY MEDICINE
Payer: COMMERCIAL

## 2017-08-13 VITALS
HEART RATE: 103 BPM | SYSTOLIC BLOOD PRESSURE: 106 MMHG | OXYGEN SATURATION: 97 % | DIASTOLIC BLOOD PRESSURE: 62 MMHG | HEIGHT: 62 IN | TEMPERATURE: 99.7 F | BODY MASS INDEX: 22.08 KG/M2 | RESPIRATION RATE: 18 BRPM | WEIGHT: 120 LBS

## 2017-08-13 DIAGNOSIS — R10.13 EPIGASTRIC PAIN: Primary | ICD-10-CM

## 2017-08-13 DIAGNOSIS — J98.01 ACUTE BRONCHOSPASM: ICD-10-CM

## 2017-08-13 LAB
ALBUMIN SERPL BCP-MCNC: 3.7 G/DL (ref 3.5–5)
ALBUMIN/GLOB SERPL: 0.9 {RATIO} (ref 1.1–2.2)
ALP SERPL-CCNC: 76 U/L (ref 45–117)
ALT SERPL-CCNC: 31 U/L (ref 12–78)
ANION GAP BLD CALC-SCNC: 11 MMOL/L (ref 5–15)
APPEARANCE UR: CLEAR
AST SERPL W P-5'-P-CCNC: 21 U/L (ref 15–37)
BACTERIA URNS QL MICRO: NEGATIVE /HPF
BASOPHILS # BLD AUTO: 0 K/UL (ref 0–0.1)
BASOPHILS # BLD: 0 % (ref 0–1)
BILIRUB SERPL-MCNC: 0.7 MG/DL (ref 0.2–1)
BILIRUB UR QL: NEGATIVE
BUN SERPL-MCNC: 26 MG/DL (ref 6–20)
BUN/CREAT SERPL: 23 (ref 12–20)
CALCIUM SERPL-MCNC: 9.5 MG/DL (ref 8.5–10.1)
CHLORIDE SERPL-SCNC: 99 MMOL/L (ref 97–108)
CO2 SERPL-SCNC: 27 MMOL/L (ref 21–32)
COLOR UR: NORMAL
CREAT SERPL-MCNC: 1.11 MG/DL (ref 0.55–1.02)
EOSINOPHIL # BLD: 0 K/UL (ref 0–0.4)
EOSINOPHIL NFR BLD: 0 % (ref 0–7)
EPITH CASTS URNS QL MICRO: NORMAL /LPF
ERYTHROCYTE [DISTWIDTH] IN BLOOD BY AUTOMATED COUNT: 13.2 % (ref 11.5–14.5)
GLOBULIN SER CALC-MCNC: 3.9 G/DL (ref 2–4)
GLUCOSE SERPL-MCNC: 110 MG/DL (ref 65–100)
GLUCOSE UR STRIP.AUTO-MCNC: NEGATIVE MG/DL
HCT VFR BLD AUTO: 39.1 % (ref 35–47)
HEMOCCULT STL QL: POSITIVE
HGB BLD-MCNC: 13.9 G/DL (ref 11.5–16)
HGB UR QL STRIP: NEGATIVE
KETONES UR QL STRIP.AUTO: NEGATIVE MG/DL
LEUKOCYTE ESTERASE UR QL STRIP.AUTO: NEGATIVE
LIPASE SERPL-CCNC: 93 U/L (ref 73–393)
LYMPHOCYTES # BLD AUTO: 7 % (ref 12–49)
LYMPHOCYTES # BLD: 0.8 K/UL (ref 0.8–3.5)
MCH RBC QN AUTO: 38.1 PG (ref 26–34)
MCHC RBC AUTO-ENTMCNC: 35.5 G/DL (ref 30–36.5)
MCV RBC AUTO: 107.1 FL (ref 80–99)
MONOCYTES # BLD: 0.9 K/UL (ref 0–1)
MONOCYTES NFR BLD AUTO: 8 % (ref 5–13)
NEUTS SEG # BLD: 9.6 K/UL (ref 1.8–8)
NEUTS SEG NFR BLD AUTO: 85 % (ref 32–75)
NITRITE UR QL STRIP.AUTO: NEGATIVE
PH UR STRIP: 6 [PH] (ref 5–8)
PLATELET # BLD AUTO: 274 K/UL (ref 150–400)
POTASSIUM SERPL-SCNC: 4.2 MMOL/L (ref 3.5–5.1)
PROT SERPL-MCNC: 7.6 G/DL (ref 6.4–8.2)
PROT UR STRIP-MCNC: NEGATIVE MG/DL
RBC # BLD AUTO: 3.65 M/UL (ref 3.8–5.2)
RBC #/AREA URNS HPF: NORMAL /HPF (ref 0–5)
SODIUM SERPL-SCNC: 137 MMOL/L (ref 136–145)
SP GR UR REFRACTOMETRY: 1.02 (ref 1–1.03)
UROBILINOGEN UR QL STRIP.AUTO: 0.2 EU/DL (ref 0.2–1)
WBC # BLD AUTO: 11.4 K/UL (ref 3.6–11)
WBC URNS QL MICRO: NORMAL /HPF (ref 0–4)

## 2017-08-13 PROCEDURE — 80053 COMPREHEN METABOLIC PANEL: CPT | Performed by: EMERGENCY MEDICINE

## 2017-08-13 PROCEDURE — 74011000250 HC RX REV CODE- 250: Performed by: EMERGENCY MEDICINE

## 2017-08-13 PROCEDURE — 96374 THER/PROPH/DIAG INJ IV PUSH: CPT

## 2017-08-13 PROCEDURE — 99285 EMERGENCY DEPT VISIT HI MDM: CPT

## 2017-08-13 PROCEDURE — 74011250636 HC RX REV CODE- 250/636: Performed by: EMERGENCY MEDICINE

## 2017-08-13 PROCEDURE — 96361 HYDRATE IV INFUSION ADD-ON: CPT

## 2017-08-13 PROCEDURE — 94640 AIRWAY INHALATION TREATMENT: CPT

## 2017-08-13 PROCEDURE — 74011250637 HC RX REV CODE- 250/637: Performed by: EMERGENCY MEDICINE

## 2017-08-13 PROCEDURE — 93005 ELECTROCARDIOGRAM TRACING: CPT

## 2017-08-13 PROCEDURE — 82272 OCCULT BLD FECES 1-3 TESTS: CPT | Performed by: EMERGENCY MEDICINE

## 2017-08-13 PROCEDURE — 81001 URINALYSIS AUTO W/SCOPE: CPT | Performed by: EMERGENCY MEDICINE

## 2017-08-13 PROCEDURE — 76705 ECHO EXAM OF ABDOMEN: CPT

## 2017-08-13 PROCEDURE — 85025 COMPLETE CBC W/AUTO DIFF WBC: CPT | Performed by: EMERGENCY MEDICINE

## 2017-08-13 PROCEDURE — 96375 TX/PRO/DX INJ NEW DRUG ADDON: CPT

## 2017-08-13 PROCEDURE — 77030013140 HC MSK NEB VYRM -A

## 2017-08-13 PROCEDURE — 77030029684 HC NEB SM VOL KT MONA -A

## 2017-08-13 PROCEDURE — 36415 COLL VENOUS BLD VENIPUNCTURE: CPT | Performed by: EMERGENCY MEDICINE

## 2017-08-13 PROCEDURE — 83690 ASSAY OF LIPASE: CPT | Performed by: EMERGENCY MEDICINE

## 2017-08-13 RX ORDER — OMEPRAZOLE 20 MG/1
20 CAPSULE, DELAYED RELEASE ORAL DAILY
Qty: 20 CAP | Refills: 0 | Status: SHIPPED | OUTPATIENT
Start: 2017-08-13 | End: 2017-09-22 | Stop reason: SDUPTHER

## 2017-08-13 RX ORDER — SODIUM CHLORIDE 0.9 % (FLUSH) 0.9 %
5-10 SYRINGE (ML) INJECTION EVERY 8 HOURS
Status: DISCONTINUED | OUTPATIENT
Start: 2017-08-13 | End: 2017-08-13 | Stop reason: HOSPADM

## 2017-08-13 RX ORDER — ONDANSETRON 4 MG/1
4 TABLET, ORALLY DISINTEGRATING ORAL
Qty: 20 TAB | Refills: 0 | Status: SHIPPED | OUTPATIENT
Start: 2017-08-13

## 2017-08-13 RX ORDER — DICYCLOMINE HYDROCHLORIDE 20 MG/1
20 TABLET ORAL EVERY 6 HOURS
Qty: 20 TAB | Refills: 0 | Status: SHIPPED | OUTPATIENT
Start: 2017-08-13 | End: 2017-08-18

## 2017-08-13 RX ORDER — DIPHENHYDRAMINE HYDROCHLORIDE 50 MG/ML
50 INJECTION, SOLUTION INTRAMUSCULAR; INTRAVENOUS
Status: COMPLETED | OUTPATIENT
Start: 2017-08-13 | End: 2017-08-13

## 2017-08-13 RX ORDER — IPRATROPIUM BROMIDE AND ALBUTEROL SULFATE 2.5; .5 MG/3ML; MG/3ML
3 SOLUTION RESPIRATORY (INHALATION)
Status: COMPLETED | OUTPATIENT
Start: 2017-08-13 | End: 2017-08-13

## 2017-08-13 RX ORDER — SODIUM CHLORIDE 0.9 % (FLUSH) 0.9 %
5-10 SYRINGE (ML) INJECTION AS NEEDED
Status: DISCONTINUED | OUTPATIENT
Start: 2017-08-13 | End: 2017-08-13 | Stop reason: HOSPADM

## 2017-08-13 RX ORDER — MORPHINE SULFATE 2 MG/ML
2 INJECTION, SOLUTION INTRAMUSCULAR; INTRAVENOUS
Status: COMPLETED | OUTPATIENT
Start: 2017-08-13 | End: 2017-08-13

## 2017-08-13 RX ADMIN — SODIUM CHLORIDE 1000 ML: 900 INJECTION, SOLUTION INTRAVENOUS at 10:55

## 2017-08-13 RX ADMIN — Medication 10 ML: at 10:55

## 2017-08-13 RX ADMIN — LIDOCAINE HYDROCHLORIDE 40 ML: 20 SOLUTION ORAL; TOPICAL at 10:55

## 2017-08-13 RX ADMIN — DIPHENHYDRAMINE HYDROCHLORIDE 50 MG: 50 INJECTION, SOLUTION INTRAMUSCULAR; INTRAVENOUS at 12:09

## 2017-08-13 RX ADMIN — IPRATROPIUM BROMIDE AND ALBUTEROL SULFATE 3 ML: .5; 3 SOLUTION RESPIRATORY (INHALATION) at 11:07

## 2017-08-13 RX ADMIN — MORPHINE SULFATE 2 MG: 2 INJECTION, SOLUTION INTRAMUSCULAR; INTRAVENOUS at 11:56

## 2017-08-13 NOTE — ED NOTES
Pt stated arms were itching after receiving Morphine and asked if she could have Benadryl. Dr. Nneka Hu notified and said he would order Benadryl for pt.

## 2017-08-13 NOTE — DISCHARGE INSTRUCTIONS
Abdominal Pain: Care Instructions  Your Care Instructions    Abdominal pain has many possible causes. Some aren't serious and get better on their own in a few days. Others need more testing and treatment. If your pain continues or gets worse, you need to be rechecked and may need more tests to find out what is wrong. You may need surgery to correct the problem. Don't ignore new symptoms, such as fever, nausea and vomiting, urination problems, pain that gets worse, and dizziness. These may be signs of a more serious problem. Your doctor may have recommended a follow-up visit in the next 8 to 12 hours. If you are not getting better, you may need more tests or treatment. The doctor has checked you carefully, but problems can develop later. If you notice any problems or new symptoms, get medical treatment right away. Follow-up care is a key part of your treatment and safety. Be sure to make and go to all appointments, and call your doctor if you are having problems. It's also a good idea to know your test results and keep a list of the medicines you take. How can you care for yourself at home? · Rest until you feel better. · To prevent dehydration, drink plenty of fluids, enough so that your urine is light yellow or clear like water. Choose water and other caffeine-free clear liquids until you feel better. If you have kidney, heart, or liver disease and have to limit fluids, talk with your doctor before you increase the amount of fluids you drink. · If your stomach is upset, eat mild foods, such as rice, dry toast or crackers, bananas, and applesauce. Try eating several small meals instead of two or three large ones. · Wait until 48 hours after all symptoms have gone away before you have spicy foods, alcohol, and drinks that contain caffeine. · Do not eat foods that are high in fat. · Avoid anti-inflammatory medicines such as aspirin, ibuprofen (Advil, Motrin), and naproxen (Aleve).  These can cause stomach upset. Talk to your doctor if you take daily aspirin for another health problem. When should you call for help? Call 911 anytime you think you may need emergency care. For example, call if:  · You passed out (lost consciousness). · You pass maroon or very bloody stools. · You vomit blood or what looks like coffee grounds. · You have new, severe belly pain. Call your doctor now or seek immediate medical care if:  · Your pain gets worse, especially if it becomes focused in one area of your belly. · You have a new or higher fever. · Your stools are black and look like tar, or they have streaks of blood. · You have unexpected vaginal bleeding. · You have symptoms of a urinary tract infection. These may include:  ¨ Pain when you urinate. ¨ Urinating more often than usual.  ¨ Blood in your urine. · You are dizzy or lightheaded, or you feel like you may faint. Watch closely for changes in your health, and be sure to contact your doctor if:  · You are not getting better after 1 day (24 hours). Where can you learn more? Go to http://annette-anish.info/. Enter B304 in the search box to learn more about \"Abdominal Pain: Care Instructions. \"  Current as of: March 20, 2017  Content Version: 11.3  © 0814-4437 [employer]. Care instructions adapted under license by [employer] (which disclaims liability or warranty for this information). If you have questions about a medical condition or this instruction, always ask your healthcare professional. [place] [address] any warranty or liability for your use of this information. We hope that we have addressed all of your medical concerns. The examination and treatment you received in the Emergency Department were for an emergent problem and were not intended as complete care. It is important that you follow up with your healthcare provider(s) for ongoing care.  If your symptoms worsen or do not improve as expected, and you are unable to reach your usual health care provider(s), you should return to the Emergency Department. Today's healthcare is undergoing tremendous change, and patient satisfaction surveys are one of the many tools to assess the quality of medical care. You may receive a survey from the [employer] regarding your experience in the Emergency Department. I hope that your experience has been completely positive, particularly the medical care that I provided. As such, please participate in the survey; anything less than excellent does not meet my expectations or intentions. [facility] [place] and [facility] participate in nationally recognized quality of care measures. If your blood pressure is greater than 120/80, as reported below, we urge that you seek medical care to address the potential of high blood pressure, commonly known as hypertension. Hypertension can be hereditary or can be caused by certain medical conditions, pain, stress, or \"white coat syndrome. \"       Please make an appointment with your health care provider(s) for follow up of your Emergency Department visit. VITALS:   Patient Vitals for the past 8 hrs:   Temp Pulse Resp BP SpO2   08/13/17 1154 - - - 116/63 96 %   08/13/17 1145 - 100 20 - 96 %   08/13/17 1100 - 99 16 126/73 99 %   08/13/17 1033 99.7 °F (37.6 °C) (!) 112 18 136/85 99 %          Thank you for allowing us to provide you with medical care today. We realize that you have many choices for your emergency care needs. Please choose us in the future for any continued health care needs. Regards,           Rudolph Hurst, [address] [place] [address] 20.   Office: [phone]            Recent Results (from the past 24 hour(s))   METABOLIC PANEL, COMPREHENSIVE    Collection Time: 08/13/17 10:47 AM   Result Value Ref Range    Sodium 137 136 - 145 mmol/L    Potassium 4.2 3.5 - 5.1 mmol/L    Chloride 99 97 - 108 mmol/L    CO2 27 21 - 32 mmol/L    Anion gap 11 5 - 15 mmol/L    Glucose 110 (H) 65 - 100 mg/dL    BUN 26 (H) 6 - 20 MG/DL    Creatinine 1.11 (H) 0.55 - 1.02 MG/DL    BUN/Creatinine ratio 23 (H) 12 - 20      GFR est AA >60 >60 ml/min/1.73m2    GFR est non-AA 54 (L) >60 ml/min/1.73m2    Calcium 9.5 8.5 - 10.1 MG/DL    Bilirubin, total 0.7 0.2 - 1.0 MG/DL    ALT (SGPT) 31 12 - 78 U/L    AST (SGOT) 21 15 - 37 U/L    Alk. phosphatase 76 45 - 117 U/L    Protein, total 7.6 6.4 - 8.2 g/dL    Albumin 3.7 3.5 - 5.0 g/dL    Globulin 3.9 2.0 - 4.0 g/dL    A-G Ratio 0.9 (L) 1.1 - 2.2     CBC WITH AUTOMATED DIFF    Collection Time: 08/13/17 10:47 AM   Result Value Ref Range    WBC 11.4 (H) 3.6 - 11.0 K/uL    RBC 3.65 (L) 3.80 - 5.20 M/uL    HGB 13.9 11.5 - 16.0 g/dL    HCT 39.1 35.0 - 47.0 %    .1 (H) 80.0 - 99.0 FL    MCH 38.1 (H) 26.0 - 34.0 PG    MCHC 35.5 30.0 - 36.5 g/dL    RDW 13.2 11.5 - 14.5 %    PLATELET 158 937 - 733 K/uL    NEUTROPHILS 85 (H) 32 - 75 %    LYMPHOCYTES 7 (L) 12 - 49 %    MONOCYTES 8 5 - 13 %    EOSINOPHILS 0 0 - 7 %    BASOPHILS 0 0 - 1 %    ABS. NEUTROPHILS 9.6 (H) 1.8 - 8.0 K/UL    ABS. LYMPHOCYTES 0.8 0.8 - 3.5 K/UL    ABS. MONOCYTES 0.9 0.0 - 1.0 K/UL    ABS. EOSINOPHILS 0.0 0.0 - 0.4 K/UL    ABS. BASOPHILS 0.0 0.0 - 0.1 K/UL   LIPASE    Collection Time: 08/13/17 10:47 AM   Result Value Ref Range    Lipase 93 73 - 393 U/L   OCCULT BLOOD, STOOL    Collection Time: 08/13/17 10:47 AM   Result Value Ref Range    Occult blood, stool POSITIVE (A) NEG         [employer]    Result Date: 8/13/2017  Clinical indication: Epigastric right upper quadrant pain. Right upper quadrant ultrasound obtained. The gallbladder appears unremarkable. The common bile duct is normal in size, the liver is echogenic. Portal vein flow is hepatopedal, main portal vein diameter is 0.8 cm, velocity 24 cm/s. Pancreatic head is poorly visualized due to gas.  The right kidney appears normal. impression: Hepatic steatosis.

## 2017-08-13 NOTE — ED TRIAGE NOTES
Pt arrives ambulatory from home c/o left sided chest pain and epigastric pain that started around midnight. Took advil with no relief. PMH of Nissen Fundolipocation approx 15 years ago.

## 2017-08-13 NOTE — ED NOTES
Pt educated on not driving after taking Morphine and Benadryl. Asked pt if she was driving home or if she had a ride.   Pt stated her roommate will be picking her up from the hospital.

## 2017-08-13 NOTE — ED PROVIDER NOTES
HPI Comments: 38 y.o. female with past medical history significant for HTN, benign brain tumor, migraine headache, COPD, hyperinsulinemia, asthma, hiatal hernia, hypothyroidism, COPD, esophagogastric fundoplasty, and nissen fundoplication who presents from home with chief complaint of chest pain. Pt began experiencing epigastric pain \"under her ribs\" that shoots up to her L-clavicle with associated nausea and \"black\" diarrhea ~ 10 hours ago. Pt feels like it is \"tight like someone is sitting on her abdomen. \" Pt states that he symptoms feel the same as when she had her fundoplication in the past. She is unable to vomit s/p this procedure. Pt is not currently on any medications for any GI conditions as she has not needed them. She denies the chance of eating anything bad or unusual last night. Pt also reports having a biopsy of her cervix 3 days ago. Pt has asthma and uses her Spiriva inhaler as needed. There are no other acute medical concerns at this time. Social hx: everyday smoker; (+) EtOH use    PCP: Harrison Hardy MD    Note written by Darwin Chaudhari, as dictated by Pascual Bui MD 10:35am     The history is provided by the patient. No  was used.         Past Medical History:   Diagnosis Date    Asthma     USES INHALER    Brain tumor ([address]     noticed at 30 y/o - told benign, followed with MRI every 3 years and not changing    Chronic obstructive pulmonary disease ([address] 10/2016    Contact dermatitis and other eczema, due to unspecified cause     Fatty liver     Hernia hiatal     Hyperinsulinemia     Hypertension     on meds    Ill-defined condition     hemochromatosis-has not been treated as of 2/19/16    Insomnia     Migraine headache, common     Nausea & vomiting     Other ill-defined conditions     has hiatal hernia    Other ill-defined conditions     has migraines/said she was told she has a brain tumor    Other ill-defined conditions 8/27/13 RECENT SINUS INFECTION    PCOD (polycystic ovarian disease)     Thyroid disease     HYPO       Past Surgical History:   Procedure Laterality Date    ABDOMEN SURGERY PROC UNLISTED  2009     abdominal liposuction    BREAST SURGERY PROCEDURE UNLISTED      left lumpectomy    HX CYST REMOVAL  1988    throat    HX GI  2003    nissen fundoplication with revision in 2005    HX GYN      benign tumor labia    HX HEENT      T & A child    HX HEENT      cyst on front of neck removed age13    HX ORTHOPAEDIC      left hand tendon repaired age 11   [de-identified] ORTHOPAEDIC  2008    LEFT FOOT SURGERY-NERVE REMOVED    HX OTHER SURGICAL      sebacous cyst removal left axilla    HX TONSIL AND ADENOIDECTOMY      MUSC/TENDON REPAIR EACH; ARM/ELBOW  1983    FL ESOPHAGOGASTRIC FUNDOPLASTY  2003 and 2004    x 2         Family History:   Problem Relation Age of Onset    Hypertension Mother     Arthritis-rheumatoid Mother     Hypertension Father     Cancer Father      prostate    Cancer Maternal Grandfather      esophageal    Stroke Paternal Grandfather        Social History     Social History    Marital status:      Spouse name: N/A    Number of children: N/A    Years of education: N/A     Occupational History    Not on file. Social History Main Topics    Smoking status: Current Every Day Smoker     Packs/day: 0.75     Years: 18.00    Smokeless tobacco: Never Used    Alcohol use Yes      Comment: 1 glass per week    Drug use: No    Sexual activity: Yes     Partners: Male     Other Topics Concern    Not on file     Social History Narrative         ALLERGIES: Morphine; Nsaids (non-steroidal anti-inflammatory drug); and Sulfa (sulfonamide antibiotics)    Review of Systems   Constitutional: Negative. Negative for appetite change, fever and unexpected weight change. HENT: Negative. Negative for ear pain, hearing loss, nosebleeds, rhinorrhea, sore throat and trouble swallowing. Respiratory: Negative. Negative for cough, chest tightness and shortness of breath. Cardiovascular: Negative. Negative for chest pain and palpitations. Gastrointestinal: Positive for abdominal pain, diarrhea and nausea. Negative for abdominal distention, blood in stool and vomiting. Endocrine: Negative. Genitourinary: Negative for dysuria and hematuria. Musculoskeletal: Negative. Negative for back pain and myalgias. Skin: Negative. Negative for rash. Allergic/Immunologic: Negative. Neurological: Negative. Negative for dizziness, syncope, weakness and numbness. Hematological: Negative. Psychiatric/Behavioral: Negative. All other systems reviewed and are negative. Vitals:    08/13/17 1029 08/13/17 1033   BP:  136/85   Pulse:  (!) 112   Resp:  18   Temp:  99.7 °F (37.6 °C)   SpO2:  99%   Weight: 54.4 kg (120 lb)    Height: 5' 2\" (1.575 m)             Physical Exam   Constitutional: She is oriented to person, place, and time. She appears well-developed and well-nourished. Somewhat anxious appearing. Mild to moderate pain distress. HENT:   Head: Normocephalic and atraumatic. Right Ear: External ear normal.   Left Ear: External ear normal.   Nose: Nose normal.   Mouth/Throat: Oropharynx is clear and moist.   Eyes: Conjunctivae and EOM are normal. Pupils are equal, round, and reactive to light. Neck: Normal range of motion. Neck supple. No JVD present. No thyromegaly present. Cardiovascular: Regular rhythm, normal heart sounds and intact distal pulses. Tachycardia present. No murmur heard. Heart tachycardic, regular without murmur. Pulmonary/Chest: Effort normal. No respiratory distress. She has wheezes. She has no rales. Lungs have mild diffuse expiratory wheezing in all fields. Abdominal: Soft. Bowel sounds are normal. She exhibits no distension. Abdomen diffusely tender in bilateral UQs and in the epigastric region. Pain is predominantly epigastric in the RUQ.  Equivocal Verma's sign. No CVA tenderness. No lower abd tenderness. Genitourinary:   Genitourinary Comments: Rectal exam shows dark brown stool with no gross blood. No internal or external lesions seen or palpated. Musculoskeletal: Normal range of motion. She exhibits no edema. Neurological: She is alert and oriented to person, place, and time. No cranial nerve deficit. Skin: Skin is warm and dry. No rash noted. Psychiatric: She has a normal mood and affect. Her behavior is normal. Thought content normal.      Note written by Darwin Bobby, as dictated by Joseline Looney MD 10:35am     [facility]  ED Course       Procedures        ED EKG interpretation:  Rhythm: sinus tachycardia; and irregular. Rate (approx.): 109; Axis: normal; ST/T wave: normal; no ectopy. Note written by Darwin Bobby, as dictated by Joseline Looney MD 10:29am     PROGRESS NOTE:  12:02 PM  Assessment and Plan: Chemistry unremarkable. WBC count of 11.4 with no left shift. Lipase normal. Fecal occult blood test positive. US of RUQ shows hepatic steatosis with otherwise normal gall bladder. Will discharge the pt with Omeprazole, Bentyl, and Zofran and advise her to f/u with her PCP this week.

## 2017-08-14 ENCOUNTER — PATIENT OUTREACH (OUTPATIENT)
Dept: OTHER | Age: 42
End: 2017-08-14

## 2017-08-14 NOTE — PROGRESS NOTES
Transition Of Care Note    Patient discharged from [facility] on  for epigastric pain, chest pain. Medical History:     Past Medical History:   Diagnosis Date    Asthma     USES INHALER    Brain tumor ([address]     noticed at 32 y/o - told benign, followed with MRI every 3 years and not changing    Chronic obstructive [address] disease [address] 10/2016    Contact dermatitis and other eczema, due to unspecified cause     Fatty liver     Hernia hiatal     Hyperinsulinemia     Hypertension     on meds    Ill-defined condition     hemochromatosis-has not been treated as of 16    Insomnia     Migraine headache, common     Nausea & vomiting     Other ill-defined conditions     has hiatal hernia    Other ill-defined conditions     has migraines/said she was told she has a brain tumor    Other ill-defined conditions 13    RECENT SINUS INFECTION    PCOD (polycystic ovarian disease)     Thyroid disease     HYPO       Care Manager contacted the patient by telephone to perform post  ED discharge assessment. Verified  and zip code with patient as identifiers. Provided introduction to self, and explanation of the Nurse Care Manager role. Medication:   Performed medication reconciliation with patient, and patient verbalizes understanding of administration of home medications. There were no barriers to obtaining medications identified at this time. Support system:  patient    Discharge Instructions :  Reviewed discharge instructions with patient. Patient verbalizes understanding of discharge instructions and follow-up care. Red Flags: Your pain gets worse, especially if it becomes focused in one area of your belly. ·You have a new or higher fever. ·Your stools are black and look like tar, or they have streaks of blood. ·You have unexpected vaginal bleeding. ·You have symptoms of a urinary tract infection. These may include:  ¨Pain when you urinate.   ¨Urinating more often than usual.  [place] in your urine. ·You are dizzy or lightheaded, or you feel like you may faint. Advance Care Planning:   Patient was offered the opportunity to discuss advance care planning:  no     Does patient have an Advance Directive:  no   If no, did you provide information on [facility]ctions?  no     PCP/Specialist follow up: Patient does not yet have scheduled follow-up, this  provided number for Dr. Phillips Catching office. Pt states she will call and make a follow-up appointment. Reviewed red flags with patient, and patient verbalizes understanding. Patient given an opportunity to ask questions. No other clinical/social/functional needs noted. The patient agrees to contact the PCP office for questions related to their healthcare. The patient expressed thanks, offered no additional questions and ended the call.

## 2017-08-15 LAB
ATRIAL RATE: 109 BPM
CALCULATED P AXIS, ECG09: 55 DEGREES
CALCULATED R AXIS, ECG10: 77 DEGREES
CALCULATED T AXIS, ECG11: 51 DEGREES
DIAGNOSIS, 93000: NORMAL
P-R INTERVAL, ECG05: 122 MS
Q-T INTERVAL, ECG07: 326 MS
QRS DURATION, ECG06: 74 MS
QTC CALCULATION (BEZET), ECG08: 439 MS
VENTRICULAR RATE, ECG03: 109 BPM

## 2017-08-16 ENCOUNTER — HOSPITAL ENCOUNTER (OUTPATIENT)
Age: 42
Setting detail: OBSERVATION
Discharge: HOME | End: 2017-08-18
Attending: EMERGENCY MEDICINE | Admitting: SURGERY
Payer: COMMERCIAL

## 2017-08-16 ENCOUNTER — ANESTHESIA EVENT (OUTPATIENT)
Dept: SURGERY | Age: 42
End: 2017-08-16
Payer: COMMERCIAL

## 2017-08-16 ENCOUNTER — ANESTHESIA (OUTPATIENT)
Dept: SURGERY | Age: 42
End: 2017-08-16
Payer: COMMERCIAL

## 2017-08-16 ENCOUNTER — APPOINTMENT (OUTPATIENT)
Dept: CT IMAGING | Age: 42
End: 2017-08-16
Attending: EMERGENCY MEDICINE
Payer: COMMERCIAL

## 2017-08-16 ENCOUNTER — HOSPITAL ENCOUNTER (OUTPATIENT)
Dept: CT IMAGING | Age: 42
Discharge: HOME | End: 2017-08-16
Attending: NURSE PRACTITIONER
Payer: COMMERCIAL

## 2017-08-16 DIAGNOSIS — R10.13 ABDOMINAL PAIN, EPIGASTRIC: ICD-10-CM

## 2017-08-16 DIAGNOSIS — R19.8 PERFORATED VISCUS: Primary | ICD-10-CM

## 2017-08-16 DIAGNOSIS — R19.02 LEFT UPPER QUADRANT ABDOMINAL SWELLING, MASS AND LUMP: ICD-10-CM

## 2017-08-16 LAB
ABO + RH BLD: NORMAL
ALBUMIN SERPL-MCNC: 3.3 G/DL (ref 3.5–5)
ALBUMIN/GLOB SERPL: 0.7 {RATIO} (ref 1.1–2.2)
ALP SERPL-CCNC: 86 U/L (ref 45–117)
ALT SERPL-CCNC: 26 U/L (ref 12–78)
ANION GAP SERPL CALC-SCNC: 11 MMOL/L (ref 5–15)
APTT PPP: 30.5 SEC (ref 22.1–32.5)
AST SERPL-CCNC: 25 U/L (ref 15–37)
BASOPHILS # BLD: 0 K/UL (ref 0–0.1)
BASOPHILS NFR BLD: 0 % (ref 0–1)
BILIRUB SERPL-MCNC: 0.3 MG/DL (ref 0.2–1)
BLOOD GROUP ANTIBODIES SERPL: NORMAL
BUN SERPL-MCNC: 23 MG/DL (ref 6–20)
BUN/CREAT SERPL: 22 (ref 12–20)
CALCIUM SERPL-MCNC: 9.9 MG/DL (ref 8.5–10.1)
CHLORIDE SERPL-SCNC: 96 MMOL/L (ref 97–108)
CO2 SERPL-SCNC: 25 MMOL/L (ref 21–32)
CREAT SERPL-MCNC: 1.06 MG/DL (ref 0.55–1.02)
EOSINOPHIL # BLD: 0.4 K/UL (ref 0–0.4)
EOSINOPHIL NFR BLD: 4 % (ref 0–7)
ERYTHROCYTE [DISTWIDTH] IN BLOOD BY AUTOMATED COUNT: 12.7 % (ref 11.5–14.5)
GLOBULIN SER CALC-MCNC: 4.7 G/DL (ref 2–4)
GLUCOSE SERPL-MCNC: 83 MG/DL (ref 65–100)
HCT VFR BLD AUTO: 39.2 % (ref 35–47)
HGB BLD-MCNC: 14.3 G/DL (ref 11.5–16)
INR PPP: 0.9 (ref 0.9–1.1)
LYMPHOCYTES # BLD: 2.2 K/UL (ref 0.8–3.5)
LYMPHOCYTES NFR BLD: 26 % (ref 12–49)
MCH RBC QN AUTO: 38.4 PG (ref 26–34)
MCHC RBC AUTO-ENTMCNC: 36.5 G/DL (ref 30–36.5)
MCV RBC AUTO: 105.4 FL (ref 80–99)
MONOCYTES # BLD: 0.9 K/UL (ref 0–1)
MONOCYTES NFR BLD: 10 % (ref 5–13)
NEUTS SEG # BLD: 5.2 K/UL (ref 1.8–8)
NEUTS SEG NFR BLD: 60 % (ref 32–75)
PLATELET # BLD AUTO: 373 K/UL (ref 150–400)
POTASSIUM SERPL-SCNC: 4.1 MMOL/L (ref 3.5–5.1)
PROT SERPL-MCNC: 8 G/DL (ref 6.4–8.2)
PROTHROMBIN TIME: 9.4 SEC (ref 9–11.1)
RBC # BLD AUTO: 3.72 M/UL (ref 3.8–5.2)
SODIUM SERPL-SCNC: 132 MMOL/L (ref 136–145)
SPECIMEN EXP DATE BLD: NORMAL
THERAPEUTIC RANGE,PTTT: NORMAL SECS (ref 58–77)
WBC # BLD AUTO: 8.8 K/UL (ref 3.6–11)

## 2017-08-16 PROCEDURE — 85730 THROMBOPLASTIN TIME PARTIAL: CPT | Performed by: EMERGENCY MEDICINE

## 2017-08-16 PROCEDURE — 74011250636 HC RX REV CODE- 250/636

## 2017-08-16 PROCEDURE — 77030031139 HC SUT VCRL2 J&J -A: Performed by: SURGERY

## 2017-08-16 PROCEDURE — 72193 CT PELVIS W/DYE: CPT

## 2017-08-16 PROCEDURE — 74011636320 HC RX REV CODE- 636/320: Performed by: RADIOLOGY

## 2017-08-16 PROCEDURE — 74011250636 HC RX REV CODE- 250/636: Performed by: ANESTHESIOLOGY

## 2017-08-16 PROCEDURE — 77030013140 HC MSK NEB VYRM -A

## 2017-08-16 PROCEDURE — 76010000149 HC OR TIME 1 TO 1.5 HR: Performed by: SURGERY

## 2017-08-16 PROCEDURE — 99218 HC RM OBSERVATION: CPT

## 2017-08-16 PROCEDURE — 80053 COMPREHEN METABOLIC PANEL: CPT | Performed by: EMERGENCY MEDICINE

## 2017-08-16 PROCEDURE — 77030020747 HC TU INSUF ENDOSC TELE -A: Performed by: SURGERY

## 2017-08-16 PROCEDURE — 74011000258 HC RX REV CODE- 258: Performed by: EMERGENCY MEDICINE

## 2017-08-16 PROCEDURE — 77030020053 HC ELECTRD LAPSCP COVD -B: Performed by: SURGERY

## 2017-08-16 PROCEDURE — 77030020061 HC IV BLD WRMR ADMIN SET 3M -B: Performed by: NURSE ANESTHETIST, CERTIFIED REGISTERED

## 2017-08-16 PROCEDURE — 77030018836 HC SOL IRR NACL ICUM -A: Performed by: SURGERY

## 2017-08-16 PROCEDURE — 74011000250 HC RX REV CODE- 250: Performed by: SURGERY

## 2017-08-16 PROCEDURE — 74011250636 HC RX REV CODE- 250/636: Performed by: SURGERY

## 2017-08-16 PROCEDURE — 77030010507 HC ADH SKN DERMBND J&J -B: Performed by: SURGERY

## 2017-08-16 PROCEDURE — 86900 BLOOD TYPING SEROLOGIC ABO: CPT | Performed by: SURGERY

## 2017-08-16 PROCEDURE — 77030008771 HC TU NG SALEM SUMP -A: Performed by: NURSE ANESTHETIST, CERTIFIED REGISTERED

## 2017-08-16 PROCEDURE — 77030032490 HC SLV COMPR SCD KNE COVD -B: Performed by: SURGERY

## 2017-08-16 PROCEDURE — 77030013079 HC BLNKT BAIR HGGR 3M -A: Performed by: NURSE ANESTHETIST, CERTIFIED REGISTERED

## 2017-08-16 PROCEDURE — 77030034850: Performed by: SURGERY

## 2017-08-16 PROCEDURE — 76060000033 HC ANESTHESIA 1 TO 1.5 HR: Performed by: SURGERY

## 2017-08-16 PROCEDURE — 77030037032 HC INSRT SCIS CLICKLLINE DISP STOR -B: Performed by: SURGERY

## 2017-08-16 PROCEDURE — 96375 TX/PRO/DX INJ NEW DRUG ADDON: CPT

## 2017-08-16 PROCEDURE — 77030035048 HC TRCR ENDOSC OPTCL COVD -B: Performed by: SURGERY

## 2017-08-16 PROCEDURE — 76210000006 HC OR PH I REC 0.5 TO 1 HR: Performed by: SURGERY

## 2017-08-16 PROCEDURE — 85610 PROTHROMBIN TIME: CPT | Performed by: EMERGENCY MEDICINE

## 2017-08-16 PROCEDURE — 74011000250 HC RX REV CODE- 250

## 2017-08-16 PROCEDURE — 74160 CT ABDOMEN W/CONTRAST: CPT

## 2017-08-16 PROCEDURE — 96365 THER/PROPH/DIAG IV INF INIT: CPT

## 2017-08-16 PROCEDURE — 85025 COMPLETE CBC W/AUTO DIFF WBC: CPT | Performed by: EMERGENCY MEDICINE

## 2017-08-16 PROCEDURE — 99284 EMERGENCY DEPT VISIT MOD MDM: CPT

## 2017-08-16 PROCEDURE — 74011250636 HC RX REV CODE- 250/636: Performed by: PHYSICIAN ASSISTANT

## 2017-08-16 PROCEDURE — 74011250636 HC RX REV CODE- 250/636: Performed by: EMERGENCY MEDICINE

## 2017-08-16 PROCEDURE — 77030035051: Performed by: SURGERY

## 2017-08-16 PROCEDURE — 77030008684 HC TU ET CUF COVD -B: Performed by: NURSE ANESTHETIST, CERTIFIED REGISTERED

## 2017-08-16 PROCEDURE — 77030019908 HC STETH ESOPH SIMS -A: Performed by: NURSE ANESTHETIST, CERTIFIED REGISTERED

## 2017-08-16 PROCEDURE — 36415 COLL VENOUS BLD VENIPUNCTURE: CPT | Performed by: EMERGENCY MEDICINE

## 2017-08-16 PROCEDURE — 77030011640 HC PAD GRND REM COVD -A: Performed by: SURGERY

## 2017-08-16 RX ORDER — MEDROXYPROGESTERONE ACETATE 10 MG/1
10 TABLET ORAL DAILY
COMMUNITY
Start: 2017-08-14 | End: 2017-08-25

## 2017-08-16 RX ORDER — LIDOCAINE HYDROCHLORIDE 10 MG/ML
0.1 INJECTION, SOLUTION EPIDURAL; INFILTRATION; INTRACAUDAL; PERINEURAL AS NEEDED
Status: DISCONTINUED | OUTPATIENT
Start: 2017-08-16 | End: 2017-08-16 | Stop reason: HOSPADM

## 2017-08-16 RX ORDER — PROPOFOL 10 MG/ML
INJECTION, EMULSION INTRAVENOUS AS NEEDED
Status: DISCONTINUED | OUTPATIENT
Start: 2017-08-16 | End: 2017-08-16 | Stop reason: HOSPADM

## 2017-08-16 RX ORDER — ALBUTEROL SULFATE 0.83 MG/ML
2.5 SOLUTION RESPIRATORY (INHALATION)
Status: DISPENSED | OUTPATIENT
Start: 2017-08-16 | End: 2017-08-17

## 2017-08-16 RX ORDER — SODIUM CHLORIDE, SODIUM LACTATE, POTASSIUM CHLORIDE, CALCIUM CHLORIDE 600; 310; 30; 20 MG/100ML; MG/100ML; MG/100ML; MG/100ML
INJECTION, SOLUTION INTRAVENOUS
Status: DISCONTINUED | OUTPATIENT
Start: 2017-08-16 | End: 2017-08-16 | Stop reason: HOSPADM

## 2017-08-16 RX ORDER — LEVOTHYROXINE SODIUM 100 UG/1
100 TABLET ORAL
Status: DISCONTINUED | OUTPATIENT
Start: 2017-08-17 | End: 2017-08-18 | Stop reason: HOSPADM

## 2017-08-16 RX ORDER — SODIUM CHLORIDE, SODIUM LACTATE, POTASSIUM CHLORIDE, CALCIUM CHLORIDE 600; 310; 30; 20 MG/100ML; MG/100ML; MG/100ML; MG/100ML
125 INJECTION, SOLUTION INTRAVENOUS CONTINUOUS
Status: DISCONTINUED | OUTPATIENT
Start: 2017-08-16 | End: 2017-08-16 | Stop reason: HOSPADM

## 2017-08-16 RX ORDER — FENTANYL CITRATE 50 UG/ML
INJECTION, SOLUTION INTRAMUSCULAR; INTRAVENOUS AS NEEDED
Status: DISCONTINUED | OUTPATIENT
Start: 2017-08-16 | End: 2017-08-16 | Stop reason: HOSPADM

## 2017-08-16 RX ORDER — SUCCINYLCHOLINE CHLORIDE 20 MG/ML
INJECTION INTRAMUSCULAR; INTRAVENOUS AS NEEDED
Status: DISCONTINUED | OUTPATIENT
Start: 2017-08-16 | End: 2017-08-16 | Stop reason: HOSPADM

## 2017-08-16 RX ORDER — SODIUM CHLORIDE 0.9 % (FLUSH) 0.9 %
5-10 SYRINGE (ML) INJECTION AS NEEDED
Status: DISCONTINUED | OUTPATIENT
Start: 2017-08-16 | End: 2017-08-18 | Stop reason: HOSPADM

## 2017-08-16 RX ORDER — ALPRAZOLAM 0.5 MG/1
0.5 TABLET ORAL
Status: DISCONTINUED | OUTPATIENT
Start: 2017-08-16 | End: 2017-08-18 | Stop reason: HOSPADM

## 2017-08-16 RX ORDER — SODIUM CHLORIDE 0.9 % (FLUSH) 0.9 %
5-10 SYRINGE (ML) INJECTION AS NEEDED
Status: DISCONTINUED | OUTPATIENT
Start: 2017-08-16 | End: 2017-08-16 | Stop reason: HOSPADM

## 2017-08-16 RX ORDER — DIPHENHYDRAMINE HYDROCHLORIDE 50 MG/ML
50 INJECTION, SOLUTION INTRAMUSCULAR; INTRAVENOUS
Status: DISCONTINUED | OUTPATIENT
Start: 2017-08-16 | End: 2017-08-18

## 2017-08-16 RX ORDER — SODIUM CHLORIDE 0.9 % (FLUSH) 0.9 %
5-10 SYRINGE (ML) INJECTION EVERY 8 HOURS
Status: DISCONTINUED | OUTPATIENT
Start: 2017-08-16 | End: 2017-08-18 | Stop reason: HOSPADM

## 2017-08-16 RX ORDER — ONDANSETRON 2 MG/ML
4 INJECTION INTRAMUSCULAR; INTRAVENOUS
Status: COMPLETED | OUTPATIENT
Start: 2017-08-16 | End: 2017-08-16

## 2017-08-16 RX ORDER — DIPHENHYDRAMINE HYDROCHLORIDE 50 MG/ML
12.5 INJECTION, SOLUTION INTRAMUSCULAR; INTRAVENOUS AS NEEDED
Status: DISCONTINUED | OUTPATIENT
Start: 2017-08-16 | End: 2017-08-16 | Stop reason: HOSPADM

## 2017-08-16 RX ORDER — MIDAZOLAM HYDROCHLORIDE 1 MG/ML
INJECTION, SOLUTION INTRAMUSCULAR; INTRAVENOUS AS NEEDED
Status: DISCONTINUED | OUTPATIENT
Start: 2017-08-16 | End: 2017-08-16 | Stop reason: HOSPADM

## 2017-08-16 RX ORDER — LIDOCAINE HYDROCHLORIDE 20 MG/ML
INJECTION, SOLUTION EPIDURAL; INFILTRATION; INTRACAUDAL; PERINEURAL AS NEEDED
Status: DISCONTINUED | OUTPATIENT
Start: 2017-08-16 | End: 2017-08-16 | Stop reason: HOSPADM

## 2017-08-16 RX ORDER — SODIUM CHLORIDE, SODIUM LACTATE, POTASSIUM CHLORIDE, CALCIUM CHLORIDE 600; 310; 30; 20 MG/100ML; MG/100ML; MG/100ML; MG/100ML
100 INJECTION, SOLUTION INTRAVENOUS CONTINUOUS
Status: DISCONTINUED | OUTPATIENT
Start: 2017-08-16 | End: 2017-08-16 | Stop reason: HOSPADM

## 2017-08-16 RX ORDER — PANTOPRAZOLE SODIUM 40 MG/1
40 TABLET, DELAYED RELEASE ORAL
Status: DISCONTINUED | OUTPATIENT
Start: 2017-08-17 | End: 2017-08-18 | Stop reason: HOSPADM

## 2017-08-16 RX ORDER — MEDROXYPROGESTERONE ACETATE 10 MG/1
10 TABLET ORAL DAILY
Status: DISCONTINUED | OUTPATIENT
Start: 2017-08-17 | End: 2017-08-18 | Stop reason: HOSPADM

## 2017-08-16 RX ORDER — ROCURONIUM BROMIDE 10 MG/ML
INJECTION, SOLUTION INTRAVENOUS AS NEEDED
Status: DISCONTINUED | OUTPATIENT
Start: 2017-08-16 | End: 2017-08-16 | Stop reason: HOSPADM

## 2017-08-16 RX ORDER — HYDROCHLOROTHIAZIDE 25 MG/1
25 TABLET ORAL DAILY
Status: DISCONTINUED | OUTPATIENT
Start: 2017-08-17 | End: 2017-08-18 | Stop reason: HOSPADM

## 2017-08-16 RX ORDER — ONDANSETRON 2 MG/ML
4 INJECTION INTRAMUSCULAR; INTRAVENOUS
Status: DISCONTINUED | OUTPATIENT
Start: 2017-08-16 | End: 2017-08-18

## 2017-08-16 RX ORDER — ONDANSETRON 2 MG/ML
4 INJECTION INTRAMUSCULAR; INTRAVENOUS AS NEEDED
Status: DISCONTINUED | OUTPATIENT
Start: 2017-08-16 | End: 2017-08-16 | Stop reason: HOSPADM

## 2017-08-16 RX ORDER — HYDROMORPHONE HYDROCHLORIDE 1 MG/ML
1 INJECTION, SOLUTION INTRAMUSCULAR; INTRAVENOUS; SUBCUTANEOUS
Status: DISCONTINUED | OUTPATIENT
Start: 2017-08-16 | End: 2017-08-18

## 2017-08-16 RX ORDER — HYDROMORPHONE HYDROCHLORIDE 1 MG/ML
.25-1 INJECTION, SOLUTION INTRAMUSCULAR; INTRAVENOUS; SUBCUTANEOUS
Status: DISCONTINUED | OUTPATIENT
Start: 2017-08-16 | End: 2017-08-16 | Stop reason: HOSPADM

## 2017-08-16 RX ORDER — HYDROCODONE BITARTRATE AND ACETAMINOPHEN 5; 325 MG/1; MG/1
1 TABLET ORAL
Status: DISCONTINUED | OUTPATIENT
Start: 2017-08-16 | End: 2017-08-17

## 2017-08-16 RX ORDER — SODIUM CHLORIDE 9 MG/ML
125 INJECTION, SOLUTION INTRAVENOUS CONTINUOUS
Status: DISCONTINUED | OUTPATIENT
Start: 2017-08-16 | End: 2017-08-17

## 2017-08-16 RX ORDER — SODIUM CHLORIDE 0.9 % (FLUSH) 0.9 %
5-10 SYRINGE (ML) INJECTION EVERY 8 HOURS
Status: DISCONTINUED | OUTPATIENT
Start: 2017-08-16 | End: 2017-08-16 | Stop reason: HOSPADM

## 2017-08-16 RX ORDER — LISINOPRIL 20 MG/1
20 TABLET ORAL DAILY
Status: DISCONTINUED | OUTPATIENT
Start: 2017-08-17 | End: 2017-08-18 | Stop reason: HOSPADM

## 2017-08-16 RX ORDER — BUPIVACAINE HYDROCHLORIDE AND EPINEPHRINE 2.5; 5 MG/ML; UG/ML
INJECTION, SOLUTION EPIDURAL; INFILTRATION; INTRACAUDAL; PERINEURAL AS NEEDED
Status: DISCONTINUED | OUTPATIENT
Start: 2017-08-16 | End: 2017-08-16 | Stop reason: HOSPADM

## 2017-08-16 RX ORDER — NALOXONE HYDROCHLORIDE 0.4 MG/ML
0.4 INJECTION, SOLUTION INTRAMUSCULAR; INTRAVENOUS; SUBCUTANEOUS AS NEEDED
Status: DISCONTINUED | OUTPATIENT
Start: 2017-08-16 | End: 2017-08-18 | Stop reason: HOSPADM

## 2017-08-16 RX ORDER — METFORMIN HYDROCHLORIDE 500 MG/1
1000 TABLET, EXTENDED RELEASE ORAL 2 TIMES DAILY
Status: DISCONTINUED | OUTPATIENT
Start: 2017-08-17 | End: 2017-08-18 | Stop reason: HOSPADM

## 2017-08-16 RX ADMIN — CEFOXITIN 1 G: 1 INJECTION, POWDER, FOR SOLUTION INTRAVENOUS at 20:09

## 2017-08-16 RX ADMIN — FENTANYL CITRATE 100 MCG: 50 INJECTION, SOLUTION INTRAMUSCULAR; INTRAVENOUS at 21:30

## 2017-08-16 RX ADMIN — SODIUM CHLORIDE, SODIUM LACTATE, POTASSIUM CHLORIDE, CALCIUM CHLORIDE: 600; 310; 30; 20 INJECTION, SOLUTION INTRAVENOUS at 21:15

## 2017-08-16 RX ADMIN — SODIUM CHLORIDE 125 ML/HR: 900 INJECTION, SOLUTION INTRAVENOUS at 23:43

## 2017-08-16 RX ADMIN — ROCURONIUM BROMIDE 5 MG: 10 INJECTION, SOLUTION INTRAVENOUS at 21:34

## 2017-08-16 RX ADMIN — MIDAZOLAM HYDROCHLORIDE 2 MG: 1 INJECTION, SOLUTION INTRAMUSCULAR; INTRAVENOUS at 21:27

## 2017-08-16 RX ADMIN — Medication 10 ML: at 23:43

## 2017-08-16 RX ADMIN — SODIUM CHLORIDE 125 ML/HR: 900 INJECTION, SOLUTION INTRAVENOUS at 23:49

## 2017-08-16 RX ADMIN — ONDANSETRON 4 MG: 2 INJECTION INTRAMUSCULAR; INTRAVENOUS at 18:08

## 2017-08-16 RX ADMIN — DIPHENHYDRAMINE HYDROCHLORIDE 50 MG: 50 INJECTION, SOLUTION INTRAMUSCULAR; INTRAVENOUS at 19:11

## 2017-08-16 RX ADMIN — SUCCINYLCHOLINE CHLORIDE 100 MG: 20 INJECTION INTRAMUSCULAR; INTRAVENOUS at 21:34

## 2017-08-16 RX ADMIN — LIDOCAINE HYDROCHLORIDE 40 MG: 20 INJECTION, SOLUTION EPIDURAL; INFILTRATION; INTRACAUDAL; PERINEURAL at 21:34

## 2017-08-16 RX ADMIN — PROPOFOL 150 MG: 10 INJECTION, EMULSION INTRAVENOUS at 21:34

## 2017-08-16 RX ADMIN — DIPHENHYDRAMINE HYDROCHLORIDE 50 MG: 50 INJECTION, SOLUTION INTRAMUSCULAR; INTRAVENOUS at 23:49

## 2017-08-16 RX ADMIN — FENTANYL CITRATE 50 MCG: 50 INJECTION, SOLUTION INTRAMUSCULAR; INTRAVENOUS at 21:27

## 2017-08-16 RX ADMIN — ONDANSETRON 4 MG: 2 INJECTION INTRAMUSCULAR; INTRAVENOUS at 23:49

## 2017-08-16 RX ADMIN — HYDROMORPHONE HYDROCHLORIDE 1 MG: 1 INJECTION, SOLUTION INTRAMUSCULAR; INTRAVENOUS; SUBCUTANEOUS at 22:54

## 2017-08-16 RX ADMIN — IOPAMIDOL 95 ML: 755 INJECTION, SOLUTION INTRAVENOUS at 14:31

## 2017-08-16 RX ADMIN — SODIUM CHLORIDE 125 ML/HR: 900 INJECTION, SOLUTION INTRAVENOUS at 20:08

## 2017-08-16 RX ADMIN — FENTANYL CITRATE 50 MCG: 50 INJECTION, SOLUTION INTRAMUSCULAR; INTRAVENOUS at 22:00

## 2017-08-16 RX ADMIN — FENTANYL CITRATE 50 MCG: 50 INJECTION, SOLUTION INTRAMUSCULAR; INTRAVENOUS at 22:15

## 2017-08-16 RX ADMIN — IOPAMIDOL 85 ML: 755 INJECTION, SOLUTION INTRAVENOUS at 19:35

## 2017-08-16 RX ADMIN — HYDROMORPHONE HYDROCHLORIDE 1 MG: 1 INJECTION, SOLUTION INTRAMUSCULAR; INTRAVENOUS; SUBCUTANEOUS at 18:09

## 2017-08-16 NOTE — H&P
Surgery History and Phsyical    Subjective: Roe Banegas is a 38 y.o. female who I was asked to see by the [facility] ER. Pt reports that she has had abdominal pain that initially was located in the SUNDAR region with radiation to the left shoulder. Pt states the pain started 4 days ago. 5 days ago she had a cervical biopsy by one of the doctors at [place] [id number]. This was done in the office. She was seen in the ED 3 days ago, ultrasound was done which did not show any reason for her pain. The pain has become more diffuse, and has not improved since being seen, so she returned tonight. She reports the pain as sharp, and is accompanied by nausea, diarrhea, and subjective fevers. She was actually told to return to the ED after she underwent outpatient CT abdomen today ordered by her GI.  CT showed a moderate amount of free air.       Past Medical History:   Diagnosis Date    Asthma     USES INHALER    Brain tumor ([address]     noticed at 30 y/o - told benign, followed with MRI every 3 years and not changing    Chronic obstructive pulmonary disease ([facility] [address] 10/2016    Contact dermatitis and other eczema, due to unspecified cause     Fatty liver     Hernia hiatal     Hyperinsulinemia     Hypertension     on meds    Ill-defined condition     hemochromatosis-has not been treated as of 2/19/16    Insomnia     Migraine headache, common     Nausea & vomiting     Other ill-defined conditions     has hiatal hernia    Other ill-defined conditions     has migraines/said she was told she has a brain tumor    Other ill-defined conditions 8/27/13    RECENT SINUS INFECTION    PCOD (polycystic ovarian disease)     Thyroid disease     HYPO     Past Surgical History:   Procedure Laterality Date    ABDOMEN SURGERY PROC UNLISTED  2009     abdominal liposuction    BREAST SURGERY PROCEDURE UNLISTED      left lumpectomy    HX CYST REMOVAL  1988    throat    HX GI  2003    nissen fundoplication with revision in 2005    HX GYN benign tumor labia    HX HEENT      T & A child    HX HEENT      cyst on front of neck removed age13    HX ORTHOPAEDIC      left hand tendon repaired age 11   [de-identified] ORTHOPAEDIC  2008    LEFT FOOT SURGERY-NERVE REMOVED    HX OTHER SURGICAL      sebacous cyst removal left axilla    HX TONSIL AND ADENOIDECTOMY      MUSC/TENDON REPAIR EACH; ARM/ELBOW  1983    [place] ESOPHAGOGASTRIC FUNDOPLASTY  2003 and 2004    x 2      Family History   Problem Relation Age of Onset    Hypertension Mother     Arthritis-rheumatoid Mother     Hypertension Father     Cancer Father      prostate    Cancer Maternal Grandfather      esophageal    Stroke Paternal Grandfather      Social History     Social History    Marital status:      Spouse name: N/A    Number of children: N/A    Years of education: N/A     Social History Main Topics    Smoking status: Current Every Day Smoker     Packs/day: 0.75     Years: 18.00    Smokeless tobacco: Never Used    Alcohol use Yes      Comment: 1 glass per week    Drug use: No    Sexual activity: Yes     Partners: Male     Other Topics Concern    Not on file     Social History Narrative      Current Facility-Administered Medications   Medication Dose Route Frequency    sodium chloride (NS) flush 5-10 mL  5-10 mL IntraVENous Q8H    sodium chloride (NS) flush 5-10 mL  5-10 mL IntraVENous PRN    HYDROmorphone (PF) (DILAUDID) injection 1 mg  1 mg IntraVENous Q2H PRN    cefOXitin (MEFOXIN) 1 g in 0.9% sodium chloride (MBP/ADV) 50 mL  1 g IntraVENous NOW    diphenhydrAMINE (BENADRYL) injection 50 mg  50 mg IntraVENous Q4H PRN    0.9% sodium chloride infusion  125 mL/hr IntraVENous CONTINUOUS     Current Outpatient Prescriptions   Medication Sig    medroxyPROGESTERone (PROVERA) 10 mg tablet Take 10 mg by mouth daily. For 12 days starting 8/14/17    dicyclomine (BENTYL) 20 mg tablet Take 1 Tab by mouth every six (6) hours for 20 doses.     ondansetron (ZOFRAN ODT) 4 mg disintegrating tablet Take 1 Tab by mouth every eight (8) hours as needed for Nausea.  omeprazole (PRILOSEC) 20 mg capsule Take 1 Cap by mouth daily.  ALPRAZolam (XANAX) 0.5 mg tablet Take 1 Tab by mouth nightly as needed. Max Daily Amount: 0.5 mg.    traZODone (DESYREL) 50 mg tablet Take 1 Tab by mouth nightly.  lisinopril (PRINIVIL, ZESTRIL) 20 mg tablet Take 1 Tab by mouth daily.  hydroCHLOROthiazide (HYDRODIURIL) 25 mg tablet Take 1 Tab by mouth daily.  benzonatate (TESSALON PERLES) 100 mg capsule Take 1 Cap by mouth three (3) times daily as needed for Cough.  SPIRIVA RESPIMAT 2.5 mcg/actuation inhaler INHALE 2 PUFFS DAILY    promethazine (PHENERGAN) 25 mg tablet Take 1 Tab by mouth every six (6) hours as needed for Nausea.  albuterol (VENTOLIN HFA) 90 mcg/actuation inhaler TAKE 2 PUFFS BY MOUTH EVERY SIX (6) HOURS AS NEEDED FOR WHEEZING.  levothyroxine (SYNTHROID) 100 mcg tablet Take 100 mcg by mouth Daily (before breakfast).  metFORMIN ER (GLUCOPHAGE XR) 500 mg tablet Take 1,000 mg by mouth two (2) times a day. Indications: POLYCYSTIC OVARIAN SYNDROME    ibuprofen (ADVIL) 200 mg tablet Take 400 mg by mouth every eight (8) hours as needed (cramps/pain/headache).  diphenhydrAMINE (BENADRYL) 25 mg capsule Take 50 mg by mouth daily as needed (allergies/itching).       Allergies   Allergen Reactions    Nsaids (Non-Steroidal Anti-Inflammatory Drug) Other (comments)     NOT SUPPOSED TO TAKE DUE TO NISSIN PROCEDURE  Not a true allergy    Sulfa (Sulfonamide Antibiotics) Unknown (comments)     As a child  Parents are both allergic to sulfa       Review of Systems:  A comprehensive review of systems was negative except for:  Constitutional: positive for fevers  Eyes: positive for none  Ears, nose, mouth, throat, and face: positive for none  Respiratory: positive for negative  Cardiovascular: positive for none  Gastrointestinal: positive for nausea, diarrhea and abdominal pain  Genitourinary: positive for none  Integument/breast: positive for none  Hematologic/lymphatic: positive for none  Musculoskeletal: positive for none  Neurological: positive for none  Behvioral/Psych: positive for none  Endocrine: positive for none  Allergic/Immunologic: positive for none    Objective:      Patient Vitals for the past 8 hrs:   BP Temp Pulse Resp SpO2 Height Weight   17 1845 110/63 - 92 18 96 % - -   17 1837 114/67 - 100 20 96 % - -   17 1830 117/68 - (!) 101 12 97 % - -   17 1742 134/83 99 °F (37.2 °C) (!) 121 16 100 % 5' 2\" (1.575 m) 54.4 kg (120 lb)       Temp (24hrs), Av °F (37.2 °C), Min:99 °F (37.2 °C), Max:99 °F (37.2 °C)      Physical Exam:  General:  Alert, cooperative, no distress, appears stated age. Eyes:  Conjunctivae/corneas clear. No scleral icterus. HENT: Normocephalic, atraumatic       Neck: Supple, no JVD. Back:   Symmetric, no curvature. ROM normal. No CVA tenderness. Lungs:   Clear to auscultation bilaterally. Heart:  Regular rate and rhythm, S1, S2 normal, no murmur, click, rub or gallop. Abdomen:   Soft, slightly distended, diffusely tender with rebound tenderness. Bowel sounds hypoactive. No masses,  No organomegaly. Musculoskeletal: Extremities normal, atraumatic, no cyanosis or edema. Psych: Normal affect   Skin: Skin color, texture, turgor normal. No rashes or lesions   Neuro: Grossly intact     Labs: Recent Labs      17   1807   WBC  8.8   HGB  14.3   HCT  39.2   PLT  373     Recent Labs      17   1807   NA  132*   K  4.1   CL  96*   CO2  25   GLU  83   BUN  23*   CREA  1.06*   CA  9.9   ALB  3.3*   TBILI  0.3   SGOT  25   ALT  26     Recent Labs      17   1807   INR  0.9     CT personally reviewed: moderate free air, but no particular area of stranding suggesting source  Assessment:     1. Pneumoperitoneum 2/2 presumed perforated viscus  2. S/p cervical biopsy  3. PCOS  4.  Asthma  5. hypothyroidism    Plan:     Unclear source of free air.  Discussed CT findings with the patient. When I initially saw her this evening, only CT abdomen had been done. CT pelvis was done as well to ensure that cervical area was not source. There is no free air locules around the cervix or uterus. Discussed with patient diagnostic laparoscopy and repair of any perforation. Discussed the risk of surgery including bleeding, infection, open conversion,  and the risks of general anesthetic. The patient understands the risks; any and all questions were answered to the patient's satisfaction.     Signed By: Ruchi Estrada MD     August 16, 2017

## 2017-08-16 NOTE — ED NOTES
Bedside shift change report given to Pb Scales RN (oncoming nurse) by Nate Fleming (offgoing nurse). Report included the following information SBAR, Kardex, ED Summary and Recent Results.

## 2017-08-16 NOTE — PROGRESS NOTES
[facility]: MED RECONCILIATION    Comments/Recommendations:    Per patient, metformin is for cysts on ovaries, not for blood sugar control   Medroxyprogesterone was prescribed 8/14/17 for 12 days for prolonged menstration   Per patient, only takes tiotropium inhaler when she thinks about it. Last dose was about a month ago. She does not report any difference when she takes it or not    Information obtained from: Patient    Chief Complaint for this Admission:   Chief Complaint   Patient presents with    Nausea    Chest Pain     Allergies: Nsaids (non-steroidal anti-inflammatory drug) and Sulfa (sulfonamide antibiotics)    Prior to Admission Medications:   Prior to Admission Medications   Prescriptions Last Dose Informant Patient Reported? Taking? ALPRAZolam (XANAX) 0.5 mg tablet 8/14/2017 Self No Yes   Sig: Take 1 Tab by mouth nightly as needed. Max Daily Amount: 0.5 mg. SPIRIVA RESPIMAT 2.5 mcg/actuation inhaler 1 month ago at Unknown time Self Yes Yes   Sig: INHALE 2 PUFFS DAILY   albuterol (VENTOLIN HFA) 90 mcg/actuation inhaler  Self No Yes   Sig: TAKE 2 PUFFS BY MOUTH EVERY SIX (6) HOURS AS NEEDED FOR WHEEZING. benzonatate (TESSALON PERLES) 100 mg capsule 8/15/2017 at HS Self No Yes   Sig: Take 1 Cap by mouth three (3) times daily as needed for Cough. dicyclomine (BENTYL) 20 mg tablet 8/16/2017 at AM Self No Yes   Sig: Take 1 Tab by mouth every six (6) hours for 20 doses. diphenhydrAMINE (BENADRYL) 25 mg capsule 8/15/2017 at PM Self Yes Yes   Sig: Take 50 mg by mouth daily as needed (allergies/itching). hydroCHLOROthiazide (HYDRODIURIL) 25 mg tablet 8/16/2017 at AM Self No Yes   Sig: Take 1 Tab by mouth daily. ibuprofen (ADVIL) 200 mg tablet  Self Yes Yes   Sig: Take 400 mg by mouth every eight (8) hours as needed (cramps/pain/headache). levothyroxine (SYNTHROID) 100 mcg tablet 8/16/2017 at AM Self Yes Yes   Sig: Take 100 mcg by mouth Daily (before breakfast).    lisinopril (Harsh Lisa) 20 mg tablet 8/16/2017 at AM Self No Yes   Sig: Take 1 Tab by mouth daily. medroxyPROGESTERone (PROVERA) 10 mg tablet 8/16/2017 at AM Self Yes Yes   Sig: Take 10 mg by mouth daily. For 12 days starting 8/14/17   metFORMIN ER (GLUCOPHAGE XR) 500 mg tablet 8/15/2017 at PM Self Yes Yes   Sig: Take 1,000 mg by mouth two (2) times a day. Indications: POLYCYSTIC OVARIAN SYNDROME   omeprazole (PRILOSEC) 20 mg capsule 8/16/2017 at AM Self No Yes   Sig: Take 1 Cap by mouth daily. ondansetron (ZOFRAN ODT) 4 mg disintegrating tablet 8/16/2017 at AM Self No Yes   Sig: Take 1 Tab by mouth every eight (8) hours as needed for Nausea. promethazine (PHENERGAN) 25 mg tablet  Self No Yes   Sig: Take 1 Tab by mouth every six (6) hours as needed for Nausea. traZODone (DESYREL) 50 mg tablet 8/15/2017 at HS Self No Yes   Sig: Take 1 Tab by mouth nightly.         Thank you,  Juanita Mayo, PharmD, [facility]

## 2017-08-16 NOTE — ED PROVIDER NOTES
HPI Comments: 38 y.o. female with past medical history significant for HTN, hernia and COPD who presents with chief complaint of abd pain. Pt reports abd pain for 4 days that was initially localized but has since become diffuse. Pt was prompted to visit the ED by her surgeon after an air pocket in her abd was seen on her CT today. Pt also reports subjective fever today. Pt reports abd pain radiates to her left shoulder. Pt reports abd swelling since onset of abd pain. Pt also reports she had biopsy of her cervix through her vagina on Friday, 2 days before onset of sx. She reports she had some cramping after procedure but states that pain resolved before onset of current abd pain. There are no other acute medical concerns at this time. Social hx: +current tobacco smoker, +EtOH use  Significant FMHx: denies family Hx of Crohn's disease and ulcerative colitis  PCP: Kieran Martel MD    8/13/17: Pt was seen in ED for epigastric pain. Had nl labs, instructed to f/u with her PCP. Note written by Darwin Padilla, as dictated by Jennifer Whalen MD 6:01 PM      The history is provided by the patient.         Past Medical History:   Diagnosis Date    Asthma     USES INHALER    Brain tumor ([address]     noticed at 32 y/o - told benign, followed with MRI every 3 years and not changing    Chronic obstructive pulmonary disease ([address] 10/2016    Contact dermatitis and other eczema, due to unspecified cause     Fatty liver     Hernia hiatal     Hyperinsulinemia     Hypertension     on meds    Ill-defined condition     hemochromatosis-has not been treated as of 2/19/16    Insomnia     Migraine headache, common     Nausea & vomiting     Other ill-defined conditions     has hiatal hernia    Other ill-defined conditions     has migraines/said she was told she has a brain tumor    Other ill-defined conditions 8/27/13    RECENT SINUS INFECTION    PCOD (polycystic ovarian disease)     Thyroid disease     HYPO Past Surgical History:   Procedure Laterality Date    ABDOMEN SURGERY PROC UNLISTED  2009     abdominal liposuction    BREAST SURGERY PROCEDURE UNLISTED      left lumpectomy    HX CYST REMOVAL  1988    throat    HX GI  2003    nissen fundoplication with revision in 2005    HX GYN      benign tumor labia    HX HEENT      T & A child    HX HEENT      cyst on front of neck removed age13    HX ORTHOPAEDIC      left hand tendon repaired age 11   [facility] ORTHOPAEDIC  2008    LEFT FOOT SURGERY-NERVE REMOVED    HX OTHER SURGICAL      sebacous cyst removal left axilla    HX TONSIL AND ADENOIDECTOMY      MUSC/TENDON REPAIR EACH; ARM/ELBOW  1983    [place] ESOPHAGOGASTRIC FUNDOPLASTY  2003 and 2004    x 2         Family History:   Problem Relation Age of Onset    Hypertension Mother     Arthritis-rheumatoid Mother     Hypertension Father     Cancer Father      prostate    Cancer Maternal Grandfather      esophageal    Stroke Paternal Grandfather        Social History     Social History    Marital status:      Spouse name: N/A    Number of children: N/A    Years of education: N/A     Occupational History    Not on file. Social History Main Topics    Smoking status: Current Every Day Smoker     Packs/day: 0.75     Years: 18.00    Smokeless tobacco: Never Used    Alcohol use Yes      Comment: 1 glass per week    Drug use: No    Sexual activity: Yes     Partners: Male     Other Topics Concern    Not on file     Social History Narrative         ALLERGIES: Nsaids (non-steroidal anti-inflammatory drug) and Sulfa (sulfonamide antibiotics)    Review of Systems   Constitutional: Positive for fever. Negative for appetite change and unexpected weight change. HENT: Negative. Negative for ear pain, hearing loss, nosebleeds, rhinorrhea, sore throat and trouble swallowing. Respiratory: Negative. Negative for cough, chest tightness and shortness of breath. Cardiovascular: Negative.   Negative for palpitations. Gastrointestinal: Positive for abdominal distention and abdominal pain. Negative for blood in stool and vomiting. Endocrine: Negative. Genitourinary: Negative for dysuria and hematuria. Musculoskeletal: Positive for arthralgias (left shoulder). Negative for back pain and myalgias. Skin: Negative. Negative for rash. Allergic/Immunologic: Negative. Neurological: Negative. Negative for dizziness, syncope, weakness and numbness. Hematological: Negative. Psychiatric/Behavioral: Negative. All other systems reviewed and are negative. Vitals:    08/16/17 1742   BP: 134/83   Pulse: (!) 121   Resp: 16   Temp: 99 °F (37.2 °C)   SpO2: 100%   Weight: 54.4 kg (120 lb)   Height: 5' 2\" (1.575 m)            Physical Exam   Constitutional: She is oriented to person, place, and time. She appears well-developed and well-nourished. HENT:   Head: Normocephalic and atraumatic. Right Ear: External ear normal.   Left Ear: External ear normal.   Nose: Nose normal.   Mouth/Throat: Oropharynx is clear and moist.   Eyes: Conjunctivae and EOM are normal. Pupils are equal, round, and reactive to light. Neck: Normal range of motion. Neck supple. No JVD present. No thyromegaly present. Cardiovascular: Regular rhythm, normal heart sounds and intact distal pulses. No murmur heard. Mild tachycardia   Pulmonary/Chest: Effort normal and breath sounds normal. No respiratory distress. She has no wheezes. She has no rales. Abdominal: Soft. Bowel sounds are normal. She exhibits no distension. There is tenderness (diffuse tenderness to palpation). No signs of acute peritonitis   Musculoskeletal: Normal range of motion. She exhibits no edema. Referred left shoulder discomfort   Neurological: She is alert and oriented to person, place, and time. No cranial nerve deficit. Skin: Skin is warm and dry. No rash noted. Psychiatric: She has a normal mood and affect.  Her behavior is normal. Thought content normal.   Nursing note and vitals reviewed. Note written by Darwin Tafoya, as dictated by Alyssa Daigle MD 6:01 PM    [facility]  ED Course       Procedures    CONSULT NOTE:  6:23 PM Alyssa Daigle MD spoke with Dr. Hilda Oliveira. JACOB Deleon., Consult for General Surgery. Discussed available diagnostic tests and clinical findings. She will see the pt. CONSULT NOTE:  7:55 PM Alyssa Daigle MD spoke with Dr. Hilda Oliveira. Pancho, Consult for General Surgery. Discussed available diagnostic tests and clinical findings. She will take pt to OR now.

## 2017-08-16 NOTE — IP AVS SNAPSHOT
[facility] 
 
 
 [address] [place] [address] 
[phone] Patient: Ada Tomas MRN: [id number] :1975 You are allergic to the following Allergen Reactions Nsaids (Non-Steroidal Anti-Inflammatory Drug) Other (comments) NOT SUPPOSED TO TAKE DUE TO NISSIN PROCEDURE Not a true allergy Sulfa (Sulfonamide Antibiotics) Unknown (comments) As a child Parents are both allergic to sulfa Recent Documentation Height Weight BMI OB Status Smoking Status 1.575 m 54.4 kg 21.95 kg/m2 Having regular periods Current Every Day Smoker Unresulted Labs Order Current Status SAMPLE TO BLOOD BANK In process Emergency Contacts Name Discharge Info Relation Home Work [facility] DISCHARGE CAREGIVER [3] Mother [14] [phone] [address] CAREGIVER [3] Spouse [3] [phone] About your hospitalization You were admitted on:  2017 You last received care in the:  [facility] 4M POST SURG ORT 2 You were discharged on:  2017 Unit phone number:  [phone] Why you were hospitalized Your primary diagnosis was:  Not on File Your diagnoses also included:  Perforated Viscus Providers Seen During Your Hospitalizations Provider Role Specialty Primary office phone Wade Moore MD Attending Provider Emergency Medicine [phone] Keo Harman MD Attending Provider General Surgery [phone] Your Primary Care Physician (PCP) Primary Care Physician Office Phone Office Fax [facility] [phone] Follow-up Information Follow up With Details Comments Contact Info Keo Harman MD In 2 weeks  [address] [facility] [address] 
[phone] Lorenzo Lou MD   [address] [facility] [address] 
[phone] Current Discharge Medication List  
  
START taking these medications Dose & Instructions Dispensing Information Comments Morning Noon Evening Bedtime HYDROmorphone 2 mg tablet Commonly known as:  DILAUDID Your last dose was: Your next dose is:    
   
   
 Dose:  2 mg Take 1 Tab by mouth every four (4) hours as needed. Max Daily Amount: 12 mg. Quantity:  30 Tab Refills:  0 CONTINUE these medications which have NOT CHANGED Dose & Instructions Dispensing Information Comments Morning Noon Evening Bedtime ADVIL 200 mg tablet Generic drug:  ibuprofen Your last dose was: Your next dose is:    
   
   
 Dose:  400 mg Take 400 mg by mouth every eight (8) hours as needed (cramps/pain/headache). Refills:  0  
     
   
   
   
  
 albuterol 90 mcg/actuation inhaler Commonly known as:  VENTOLIN HFA Your last dose was: Your next dose is: TAKE 2 PUFFS BY MOUTH EVERY SIX (6) HOURS AS NEEDED FOR WHEEZING. Quantity:  1 Inhaler Refills:  3 ALPRAZolam 0.5 mg tablet Commonly known as:  Amarilis Rendon Your last dose was: Your next dose is:    
   
   
 Dose:  0.5 mg Take 1 Tab by mouth nightly as needed. Max Daily Amount: 0.5 mg.  
 Quantity:  30 Tab Refills:  3 BENADRYL 25 mg capsule Generic drug:  diphenhydrAMINE Your last dose was: Your next dose is:    
   
   
 Dose:  50 mg Take 50 mg by mouth daily as needed (allergies/itching). Refills:  0  
     
   
   
   
  
 benzonatate 100 mg capsule Commonly known as:  VICKI ARCOS Your last dose was: Your next dose is:    
   
   
 Dose:  100 mg Take 1 Cap by mouth three (3) times daily as needed for Cough. Quantity:  90 Cap Refills:  1  
     
   
   
   
  
 dicyclomine 20 mg tablet Commonly known as:  BENTYL Your last dose was: Your next dose is:    
   
   
 Dose:  20 mg Take 1 Tab by mouth every six (6) hours for 20 doses. Quantity:  20 Tab Refills:  0  
     
   
   
   
  
 hydroCHLOROthiazide 25 mg tablet Commonly known as:  HYDRODIURIL Your last dose was: Your next dose is:    
   
   
 Dose:  25 mg Take 1 Tab by mouth daily. Quantity:  90 Tab Refills:  3  
     
   
   
   
  
 lisinopril 20 mg tablet Commonly known as:  Leslie Husbands Your last dose was: Your next dose is:    
   
   
 Dose:  20 mg Take 1 Tab by mouth daily. Quantity:  90 Tab Refills:  1  
     
   
   
   
  
 medroxyPROGESTERone 10 mg tablet Commonly known as:  PROVERA Your last dose was: Your next dose is:    
   
   
 Dose:  10 mg Take 10 mg by mouth daily. For 12 days starting 8/14/17 Refills:  0  
     
   
   
   
  
 metFORMIN  mg tablet Commonly known as:  GLUCOPHAGE XR Your last dose was: Your next dose is:    
   
   
 Dose:  1000 mg Take 1,000 mg by mouth two (2) times a day. Indications: POLYCYSTIC OVARIAN SYNDROME Quantity:  30 Tab Refills:  0  
     
   
   
   
  
 omeprazole 20 mg capsule Commonly known as:  PRILOSEC Your last dose was: Your next dose is:    
   
   
 Dose:  20 mg Take 1 Cap by mouth daily. Quantity:  20 Cap Refills:  0  
     
   
   
   
  
 ondansetron 4 mg disintegrating tablet Commonly known as:  ZOFRAN ODT Your last dose was: Your next dose is:    
   
   
 Dose:  4 mg Take 1 Tab by mouth every eight (8) hours as needed for Nausea. Quantity:  20 Tab Refills:  0  
     
   
   
   
  
 promethazine 25 mg tablet Commonly known as:  PHENERGAN Your last dose was: Your next dose is:    
   
   
 Dose:  25 mg Take 1 Tab by mouth every six (6) hours as needed for Nausea. Quantity:  30 Tab Refills:  1 SPIRIVA RESPIMAT 2.5 mcg/actuation inhaler Generic drug:  tiotropium bromide Your last dose was: Your next dose is:    
   
   
 INHALE 2 PUFFS DAILY Refills:  5 SYNTHROID 100 mcg tablet Generic drug:  levothyroxine Your last dose was: Your next dose is:    
   
   
 Dose:  100 mcg Take 100 mcg by mouth Daily (before breakfast). Refills:  0  
     
   
   
   
  
 traZODone 50 mg tablet Commonly known as:  Umu Homme Your last dose was: Your next dose is:    
   
   
 Dose:  50 mg Take 1 Tab by mouth nightly. Quantity:  30 Tab Refills:  3 Where to Get Your Medications Information on where to get these meds will be given to you by the nurse or doctor. ! Ask your nurse or doctor about these medications HYDROmorphone 2 mg tablet Discharge Instructions Patient Discharge Instructions Yahaira Salas / [id number] : 1975 Admitted 2017 Discharged: 2017 Take Home Medications · It is important that you take the medication exactly as they are prescribed. · Keep your medication in the bottles provided by the pharmacist and keep a list of the medication names, dosages, and times to be taken in your wallet. · Do not take other medications without consulting your doctor. · Do not drive, drink alcohol, or operate machinery when taking sedating pain medication. · Take colace daily while on pain medication to help prevent constipation. You may take over the counter laxatives such as Dulcolax or Miralax as needed for constipation What to do at [facility] Recommended diet: Regular Diet Recommended activity: Activity as tolerated. You may shower. You may drive once pain has improved and you no longer require pain medication. Follow up with Dr. Roberto Carlos Olivares in 2 weeks.  Call [facility] to make an appointment. Call Dr. Salome Arguelles or go to the ER if you develop worsening pain, fever, vomiting, or any other symptoms that concern you. Discharge Orders None Introducing \[facility] [place] [facility]\"" SERVICES! [employer] introduces [facility] patient portal. Now you can access parts of your medical record, email your doctor's office, and request medication refills online. 1. In your internet browser, go to [email or web address] 2. Click on the First Time User? Click Here link in the Sign In box. You will see the New Member Sign Up page. 3. Enter your [facility] Access Code exactly as it appears below. You will not need to use this code after youve completed the sign-up process. If you do not sign up before the expiration date, you must request a new code. · [facility] Access Code: [email or web address] Expires: 10/9/2017  6:29 PM 
 
4. Enter the last four digits of your Social Security Number (xxxx) and Date of Birth (mm/dd/yyyy) as indicated and click Submit. You will be taken to the next sign-up page. 5. Create a [facility] ID. This will be your [facility] login ID and cannot be changed, so think of one that is secure and easy to remember. 6. Create a [facility] password. You can change your password at any time. 7. Enter your Password Reset Question and Answer. This can be used at a later time if you forget your password. 8. Enter your e-mail address. You will receive e-mail notification when new information is available in [address] 9. Click Sign Up. You can now view and download portions of your medical record. 10. Click the Download Summary menu link to download a portable copy of your medical information. If you have questions, please visit the Frequently Asked Questions section of the [facility] website. Remember, [facility] is NOT to be used for urgent needs. For medical emergencies, dial 911. Now available from your iPhone and Android! General Information Please provide this summary of care documentation to your next provider. Patient Signature:  ____________________________________________________________ Date:  ____________________________________________________________  
  
Betsy Piety Provider Signature:  ____________________________________________________________ Date:  ____________________________________________________________

## 2017-08-16 NOTE — IP AVS SNAPSHOT
[address] 
[phone] Patient: Shaggy Souza MRN: [id number] :1975 Current Discharge Medication List  
  
START taking these medications Dose & Instructions Dispensing Information Comments Morning Noon Evening Bedtime HYDROmorphone 2 mg tablet Commonly known as:  DILAUDID Your last dose was: Your next dose is:    
   
   
 Dose:  2 mg Take 1 Tab by mouth every four (4) hours as needed. Max Daily Amount: 12 mg. Quantity:  30 Tab Refills:  0 CONTINUE these medications which have NOT CHANGED Dose & Instructions Dispensing Information Comments Morning Noon Evening Bedtime ADVIL 200 mg tablet Generic drug:  ibuprofen Your last dose was: Your next dose is:    
   
   
 Dose:  400 mg Take 400 mg by mouth every eight (8) hours as needed (cramps/pain/headache). Refills:  0  
     
   
   
   
  
 albuterol 90 mcg/actuation inhaler Commonly known as:  VENTOLIN HFA Your last dose was: Your next dose is: TAKE 2 PUFFS BY MOUTH EVERY SIX (6) HOURS AS NEEDED FOR WHEEZING. Quantity:  1 Inhaler Refills:  3 ALPRAZolam 0.5 mg tablet Commonly known as:  Petra Cris Your last dose was: Your next dose is:    
   
   
 Dose:  0.5 mg Take 1 Tab by mouth nightly as needed. Max Daily Amount: 0.5 mg.  
 Quantity:  30 Tab Refills:  3 BENADRYL 25 mg capsule Generic drug:  diphenhydrAMINE Your last dose was: Your next dose is:    
   
   
 Dose:  50 mg Take 50 mg by mouth daily as needed (allergies/itching). Refills:  0  
     
   
   
   
  
 benzonatate 100 mg capsule Commonly known as:  TESFELIPE ARCOS Your last dose was:     
   
Your next dose is:    
   
   
 Dose:  100 mg  
 Take 1 Cap by mouth three (3) times daily as needed for Cough. Quantity:  90 Cap Refills:  1  
     
   
   
   
  
 dicyclomine 20 mg tablet Commonly known as:  BENTYL Your last dose was: Your next dose is:    
   
   
 Dose:  20 mg Take 1 Tab by mouth every six (6) hours for 20 doses. Quantity:  20 Tab Refills:  0  
     
   
   
   
  
 hydroCHLOROthiazide 25 mg tablet Commonly known as:  HYDRODIURIL Your last dose was: Your next dose is:    
   
   
 Dose:  25 mg Take 1 Tab by mouth daily. Quantity:  90 Tab Refills:  3  
     
   
   
   
  
 lisinopril 20 mg tablet Commonly known as:  Cate [place] Your last dose was: Your next dose is:    
   
   
 Dose:  20 mg Take 1 Tab by mouth daily. Quantity:  90 Tab Refills:  1  
     
   
   
   
  
 medroxyPROGESTERone 10 mg tablet Commonly known as:  PROVERA Your last dose was: Your next dose is:    
   
   
 Dose:  10 mg Take 10 mg by mouth daily. For 12 days starting 8/14/17 Refills:  0  
     
   
   
   
  
 metFORMIN  mg tablet Commonly known as:  GLUCOPHAGE XR Your last dose was: Your next dose is:    
   
   
 Dose:  1000 mg Take 1,000 mg by mouth two (2) times a day. Indications: POLYCYSTIC OVARIAN SYNDROME Quantity:  30 Tab Refills:  0  
     
   
   
   
  
 omeprazole 20 mg capsule Commonly known as:  PRILOSEC Your last dose was: Your next dose is:    
   
   
 Dose:  20 mg Take 1 Cap by mouth daily. Quantity:  20 Cap Refills:  0  
     
   
   
   
  
 ondansetron 4 mg disintegrating tablet Commonly known as:  ZOFRAN ODT Your last dose was: Your next dose is:    
   
   
 Dose:  4 mg Take 1 Tab by mouth every eight (8) hours as needed for Nausea. Quantity:  20 Tab Refills:  0  
     
   
   
   
  
 promethazine 25 mg tablet Commonly known as:  PHENERGAN Your last dose was: Your next dose is:    
   
   
 Dose:  25 mg Take 1 Tab by mouth every six (6) hours as needed for Nausea. Quantity:  30 Tab Refills:  1 SPIRIVA RESPIMAT 2.5 mcg/actuation inhaler Generic drug:  tiotropium bromide Your last dose was: Your next dose is:    
   
   
 INHALE 2 PUFFS DAILY Refills:  5 SYNTHROID 100 mcg tablet Generic drug:  levothyroxine Your last dose was: Your next dose is:    
   
   
 Dose:  100 mcg Take 100 mcg by mouth Daily (before breakfast). Refills:  0  
     
   
   
   
  
 traZODone 50 mg tablet Commonly known as:  Ruiz Whyte Your last dose was: Your next dose is:    
   
   
 Dose:  50 mg Take 1 Tab by mouth nightly. Quantity:  30 Tab Refills:  3 Where to Get Your Medications Information on where to get these meds will be given to you by the nurse or doctor. ! Ask your nurse or doctor about these medications HYDROmorphone 2 mg tablet

## 2017-08-17 PROCEDURE — 74011250636 HC RX REV CODE- 250/636: Performed by: EMERGENCY MEDICINE

## 2017-08-17 PROCEDURE — 74011250637 HC RX REV CODE- 250/637: Performed by: PHYSICIAN ASSISTANT

## 2017-08-17 PROCEDURE — 74011250637 HC RX REV CODE- 250/637: Performed by: SURGERY

## 2017-08-17 PROCEDURE — 74011000258 HC RX REV CODE- 258: Performed by: SURGERY

## 2017-08-17 PROCEDURE — 99218 HC RM OBSERVATION: CPT

## 2017-08-17 PROCEDURE — 74011250636 HC RX REV CODE- 250/636: Performed by: SURGERY

## 2017-08-17 RX ORDER — OXYCODONE HYDROCHLORIDE 5 MG/1
5 TABLET ORAL
Status: DISCONTINUED | OUTPATIENT
Start: 2017-08-17 | End: 2017-08-18

## 2017-08-17 RX ORDER — HYDROCODONE BITARTRATE AND ACETAMINOPHEN 5; 325 MG/1; MG/1
1 TABLET ORAL
Qty: 30 TAB | Refills: 0 | Status: SHIPPED | OUTPATIENT
Start: 2017-08-17 | End: 2017-08-18

## 2017-08-17 RX ORDER — OXYCODONE HYDROCHLORIDE 5 MG/1
5-10 TABLET ORAL
Status: DISCONTINUED | OUTPATIENT
Start: 2017-08-17 | End: 2017-08-17

## 2017-08-17 RX ORDER — OXYCODONE HYDROCHLORIDE 5 MG/1
10 TABLET ORAL
Status: DISCONTINUED | OUTPATIENT
Start: 2017-08-17 | End: 2017-08-18

## 2017-08-17 RX ADMIN — Medication 10 ML: at 05:21

## 2017-08-17 RX ADMIN — CEFOXITIN SODIUM 1 G: 1 POWDER, FOR SOLUTION INTRAVENOUS at 12:37

## 2017-08-17 RX ADMIN — Medication 5 ML: at 20:29

## 2017-08-17 RX ADMIN — HYDROMORPHONE HYDROCHLORIDE 1 MG: 1 INJECTION, SOLUTION INTRAMUSCULAR; INTRAVENOUS; SUBCUTANEOUS at 09:03

## 2017-08-17 RX ADMIN — HYDROCODONE BITARTRATE AND ACETAMINOPHEN 1 TABLET: 5; 325 TABLET ORAL at 02:17

## 2017-08-17 RX ADMIN — Medication 10 ML: at 12:38

## 2017-08-17 RX ADMIN — OXYCODONE HYDROCHLORIDE 10 MG: 5 TABLET ORAL at 20:27

## 2017-08-17 RX ADMIN — OXYCODONE HYDROCHLORIDE 10 MG: 5 TABLET ORAL at 16:40

## 2017-08-17 RX ADMIN — DIPHENHYDRAMINE HYDROCHLORIDE 50 MG: 50 INJECTION, SOLUTION INTRAMUSCULAR; INTRAVENOUS at 16:38

## 2017-08-17 RX ADMIN — DIPHENHYDRAMINE HYDROCHLORIDE 50 MG: 50 INJECTION, SOLUTION INTRAMUSCULAR; INTRAVENOUS at 20:27

## 2017-08-17 RX ADMIN — DIPHENHYDRAMINE HYDROCHLORIDE 50 MG: 50 INJECTION, SOLUTION INTRAMUSCULAR; INTRAVENOUS at 03:06

## 2017-08-17 RX ADMIN — HYDROCODONE BITARTRATE AND ACETAMINOPHEN 1 TABLET: 5; 325 TABLET ORAL at 12:47

## 2017-08-17 RX ADMIN — UMECLIDINIUM 1 PUFF: 62.5 AEROSOL, POWDER ORAL at 09:08

## 2017-08-17 RX ADMIN — ONDANSETRON 4 MG: 2 INJECTION INTRAMUSCULAR; INTRAVENOUS at 09:25

## 2017-08-17 RX ADMIN — PANTOPRAZOLE SODIUM 40 MG: 40 TABLET, DELAYED RELEASE ORAL at 07:08

## 2017-08-17 RX ADMIN — DIPHENHYDRAMINE HYDROCHLORIDE 50 MG: 50 INJECTION, SOLUTION INTRAMUSCULAR; INTRAVENOUS at 10:33

## 2017-08-17 RX ADMIN — ONDANSETRON 4 MG: 2 INJECTION INTRAMUSCULAR; INTRAVENOUS at 03:15

## 2017-08-17 RX ADMIN — HYDROMORPHONE HYDROCHLORIDE 1 MG: 1 INJECTION, SOLUTION INTRAMUSCULAR; INTRAVENOUS; SUBCUTANEOUS at 05:21

## 2017-08-17 RX ADMIN — LEVOTHYROXINE SODIUM 100 MCG: 100 TABLET ORAL at 07:08

## 2017-08-17 RX ADMIN — CEFOXITIN SODIUM 1 G: 1 POWDER, FOR SOLUTION INTRAVENOUS at 20:22

## 2017-08-17 RX ADMIN — MEDROXYPROGESTERONE ACETATE 10 MG: 10 TABLET ORAL at 09:08

## 2017-08-17 RX ADMIN — ONDANSETRON 4 MG: 2 INJECTION INTRAMUSCULAR; INTRAVENOUS at 16:33

## 2017-08-17 RX ADMIN — CEFOXITIN SODIUM 1 G: 1 POWDER, FOR SOLUTION INTRAVENOUS at 03:05

## 2017-08-17 RX ADMIN — DIPHENHYDRAMINE HYDROCHLORIDE 50 MG: 50 INJECTION, SOLUTION INTRAMUSCULAR; INTRAVENOUS at 07:08

## 2017-08-17 NOTE — BRIEF OP NOTE
BRIEF OPERATIVE NOTE    Date of Procedure: 8/16/2017   Preoperative Diagnosis: Free air  Postoperative Diagnosis: Free air of unknown etiology   Procedure(s):  LAPAROSCOPY GENERAL DIAGNOSTIC  Surgeon(s) and Role:     * Corwin Crespo MD - Primary         Assistant Staff:       Surgical Staff:  Circ-1: Mahad Hu RN  Scrub Tech-1: Valentino Romance  Surg Asst-1: Tania Thorne  Event Time In   Incision Start 2157   Incision Close 2231     Anesthesia: General   Estimated Blood Loss: minimal  Specimens: * No specimens in log *   Findings: patchy hyperemic and dilated bowel without cause for transition point, minimal omental adhesion to abdominal wall, cystic left ovary, endometriosis noted in pelvis. No fibrinous exudate or omental adhesions to the bowel. Intestine run from ligament of trietz to rectum. No abnormalities otherwise. No perforation noted.    Complications: none  Implants: * No implants in log *

## 2017-08-17 NOTE — ANESTHESIA PREPROCEDURE EVALUATION
Anesthetic History     PONV          Review of Systems / Medical History  Patient summary reviewed, nursing notes reviewed and pertinent labs reviewed    Pulmonary          Smoker  Asthma        Neuro/Psych         Headaches     Cardiovascular    Hypertension              Exercise tolerance: >4 METS     GI/Hepatic/Renal           Hiatal hernia and liver disease (fatty liver)    Comments: S/p nissen 2003 Endo/Other      Hypothyroidism       Other Findings   Comments: PCOS  Hemochromatosis         Physical Exam    Airway  Mallampati: II  TM Distance: 4 - 6 cm  Neck ROM: normal range of motion   Mouth opening: Normal     Cardiovascular    Rhythm: regular  Rate: normal         Dental    Dentition: Lower dentition intact and Upper dentition intact     Pulmonary  Breath sounds clear to auscultation               Abdominal         Other Findings            Anesthetic Plan    ASA: 3  Anesthesia type: general          Induction: Intravenous and RSI  Anesthetic plan and risks discussed with: Patient

## 2017-08-17 NOTE — ANESTHESIA POSTPROCEDURE EVALUATION
Post-Anesthesia Evaluation and Assessment    Patient: Hakeem Mina MRN: [id number]  SSN: [id number]    YOB: 1975  Age: 38 y.o. Sex: female       Cardiovascular Function/Vital Signs  Visit Vitals    /70    Pulse 96    Temp 37.2 °C (99 °F)    Resp 19    Ht 5' 2\" (1.575 m)    Wt 54.4 kg (120 lb)    SpO2 95%    BMI 21.95 kg/m2       Patient is status post general anesthesia for Procedure(s):  LAPAROSCOPY GENERAL DIAGNOSTIC. Nausea/Vomiting: None    Postoperative hydration reviewed and adequate. Pain:  Pain Scale 1: Numeric (0 - 10) (08/16/17 2255)  Pain Intensity 1: 8 (08/16/17 2255)   Managed    Neurological Status:   Neuro (WDL): Exceptions to WDL (08/16/17 2250)  Neuro  Neurologic State: Agitated (08/16/17 2250)   At baseline    Mental Status and Level of Consciousness: Arousable    Pulmonary Status:   O2 Device: Room air (08/16/17 2250)   Adequate oxygenation and airway patent    Complications related to anesthesia: None    Post-anesthesia assessment completed.  No concerns    Signed By: Ximena Mckenzie MD     August 16, 2017

## 2017-08-17 NOTE — PROGRESS NOTES
08/17/17     MSW met with the patient to complete initial assessment and discharge planning. Patient is  from her  and living in a 2 story home with a roommate. She has been managing all the stairs, independent, driving and working PTA. She uses no medical equipment, never had [facility]. Her PCP is Dr. Faye Salazar. She also has a prescription plan. OBS letter given, signed and in the chart. Her father will transport her home. Care Management Interventions  PCP Verified by CM: Yes (Dr. [place])  Palliative Care Consult (Criteria: CHF and RRAT>21): No  Reason for No Palliative Care Consult: Other (see comment) (not relevant)  Physical Therapy Consult: No  Occupational Therapy Consult: No  Speech Therapy Consult: No  Current Support Network:  Other (live wit roommate)  Confirm Follow Up Transport: Family  Plan discussed with Pt/Family/Caregiver: Yes  Discharge Location  Discharge Placement: Home     EDITH Lawler

## 2017-08-17 NOTE — PROGRESS NOTES
General Surgery Daily Progress Note    Patient: Deangelo Reinoso MRN: [id number]  SSN: [id number]    YOB: 1975  Age: 38 y.o. Sex: female      Admit Date: 8/16/2017    Subjective:   C/o moderate incisional pain. Denies N/V, tolerating diet. + BM    Current Facility-Administered Medications   Medication Dose Route Frequency    sodium chloride (NS) flush 5-10 mL  5-10 mL IntraVENous Q8H    sodium chloride (NS) flush 5-10 mL  5-10 mL IntraVENous PRN    HYDROmorphone (PF) (DILAUDID) injection 1 mg  1 mg IntraVENous Q2H PRN    diphenhydrAMINE (BENADRYL) injection 50 mg  50 mg IntraVENous Q4H PRN    ALPRAZolam (XANAX) tablet 0.5 mg  0.5 mg Oral QHS PRN    hydroCHLOROthiazide (HYDRODIURIL) tablet 25 mg  25 mg Oral DAILY    levothyroxine (SYNTHROID) tablet 100 mcg  100 mcg Oral ACB    lisinopril (PRINIVIL, ZESTRIL) tablet 20 mg  20 mg Oral DAILY    medroxyPROGESTERone (PROVERA) tablet 10 mg  10 mg Oral DAILY    metFORMIN ER (GLUCOPHAGE XR) tablet 1,000 mg  1,000 mg Oral BID    pantoprazole (PROTONIX) tablet 40 mg  40 mg Oral ACB    umeclidinium (INCRUSE ELLIPTA) 62.5 mcg/actuation  1 Puff Inhalation DAILY    sodium chloride (NS) flush 5-10 mL  5-10 mL IntraVENous Q8H    sodium chloride (NS) flush 5-10 mL  5-10 mL IntraVENous PRN    naloxone (NARCAN) injection 0.4 mg  0.4 mg IntraVENous PRN    HYDROcodone-acetaminophen (NORCO) 5-325 mg per tablet 1 Tab  1 Tab Oral Q4H PRN    ondansetron (ZOFRAN) injection 4 mg  4 mg IntraVENous Q4H PRN    cefOXitin (MEFOXIN) 1 g in 0.9% sodium chloride (MBP/ADV) 50 mL  1 g IntraVENous Q8H        Objective:      08/15 1901 - 08/17 0700  In: 1467.1 [P.O.:240;  I.V.:1227.1]  Out: 4 [Urine:4]  Patient Vitals for the past 8 hrs:   BP Temp Pulse Resp SpO2   08/17/17 1153 111/62 99.9 °F (37.7 °C) 97 16 94 %   08/17/17 0842 111/62 99.5 °F (37.5 °C) (!) 102 16 96 %       Physical Exam:  General: Alert, cooperative, NAD  Lungs: Unlabored  Heart:  Regular rate and rhythm  Abdomen: Soft, ATTP, non-distended. + bowel sounds.  Incisions c/d/i   Extremities: Warm, moves all, no edema  Skin:  Warm and dry, no rash    Labs: Recent Labs      08/16/17   1807   WBC  8.8   HGB  14.3   HCT  39.2   PLT  373     Recent Labs      08/16/17   1807   NA  132*   K  4.1   CL  96*   CO2  25   GLU  83   BUN  23*   CREA  1.06*   CA  9.9   ALB  3.3*   TBILI  0.3   SGOT  25   ALT  26       Assessment / Plan:   · POD#1 Diagnostic lap for pneumoperitoneum  · Abdominal exam appropriate  ·  Tolerating diet  · PO pain meds  · D/c IVF  · Anticipate discharge later today

## 2017-08-17 NOTE — ROUTINE PROCESS
TRANSFER - OUT REPORT:    Verbal report given to DUY Choudhary on Ramona Esteban  being transferred to [address] [place] [address] for routine post - op       Report consisted of patients Situation, Background, Assessment and   Recommendations(SBAR). Information from the following report(s) SBAR and OR Summary was reviewed with the receiving nurse. Lines:   Peripheral IV 08/16/17 Left Forearm (Active)   Site Assessment Clean, dry, & intact 8/16/2017 10:49 PM   Phlebitis Assessment 0 8/16/2017 10:49 PM   Infiltration Assessment 0 8/16/2017 10:49 PM   Dressing Status Clean, dry, & intact 8/16/2017 10:49 PM   Dressing Type Tape;Transparent 8/16/2017 10:49 PM   Hub Color/Line Status Pink 8/16/2017 10:49 PM       Peripheral IV Left Hand (Active)   Site Assessment Clean, dry, & intact 8/16/2017 10:49 PM   Phlebitis Assessment 0 8/16/2017 10:49 PM   Infiltration Assessment 0 8/16/2017 10:49 PM   Dressing Status Clean, dry, & intact 8/16/2017 10:49 PM   Dressing Type Tape;Transparent 8/16/2017 10:49 PM   Hub Color/Line Status Pink 8/16/2017 10:49 PM        Opportunity for questions and clarification was provided.       Patient transported with:   Registered Nurse

## 2017-08-17 NOTE — PROGRESS NOTES
Bedside, Verbal and Written shift change report given to Carla Batista (oncoming nurse) by Shandra Ards RN (offgoing nurse). Report included the following information SBAR, Kardex, Intake/Output, MAR and Recent Results.

## 2017-08-17 NOTE — ED NOTES
TRANSFER - OUT REPORT:    Verbal report given to [facility] RN(name) on [place] [facility]  being transferred to Pre-Op(unit) for routine progression of care       Report consisted of patients Situation, Background, Assessment and   Recommendations(SBAR). Information from the following report(s) SBAR, Kardex, ED Summary and Recent Results was reviewed with the receiving nurse. Lines:   Peripheral IV 08/16/17 Left Forearm (Active)   Site Assessment Clean, dry, & intact 8/16/2017  6:07 PM   Phlebitis Assessment 0 8/16/2017  6:07 PM   Infiltration Assessment 0 8/16/2017  6:07 PM   Dressing Status Clean, dry, & intact 8/16/2017  6:07 PM   Dressing Type Transparent 8/16/2017  6:07 PM        Opportunity for questions and clarification was provided. Patient transported with:   Registered Nurse  Tech        Preop notified of need for second pink top.

## 2017-08-18 VITALS
OXYGEN SATURATION: 100 % | RESPIRATION RATE: 18 BRPM | HEART RATE: 90 BPM | DIASTOLIC BLOOD PRESSURE: 68 MMHG | TEMPERATURE: 98.8 F | BODY MASS INDEX: 22.08 KG/M2 | SYSTOLIC BLOOD PRESSURE: 93 MMHG | HEIGHT: 62 IN | WEIGHT: 120 LBS

## 2017-08-18 PROCEDURE — 74011250636 HC RX REV CODE- 250/636: Performed by: EMERGENCY MEDICINE

## 2017-08-18 PROCEDURE — 74011250637 HC RX REV CODE- 250/637: Performed by: PHYSICIAN ASSISTANT

## 2017-08-18 PROCEDURE — 74011000258 HC RX REV CODE- 258: Performed by: SURGERY

## 2017-08-18 PROCEDURE — 99218 HC RM OBSERVATION: CPT

## 2017-08-18 PROCEDURE — 74011250636 HC RX REV CODE- 250/636: Performed by: SURGERY

## 2017-08-18 PROCEDURE — 74011250637 HC RX REV CODE- 250/637: Performed by: SURGERY

## 2017-08-18 RX ORDER — HYDROMORPHONE HYDROCHLORIDE 1 MG/ML
1 INJECTION, SOLUTION INTRAMUSCULAR; INTRAVENOUS; SUBCUTANEOUS
Status: DISCONTINUED | OUTPATIENT
Start: 2017-08-18 | End: 2017-08-18

## 2017-08-18 RX ORDER — DIPHENHYDRAMINE HCL 25 MG
50 CAPSULE ORAL
Status: DISCONTINUED | OUTPATIENT
Start: 2017-08-18 | End: 2017-08-18 | Stop reason: HOSPADM

## 2017-08-18 RX ORDER — HYDROMORPHONE HYDROCHLORIDE 1 MG/ML
1 INJECTION, SOLUTION INTRAMUSCULAR; INTRAVENOUS; SUBCUTANEOUS
Status: DISCONTINUED | OUTPATIENT
Start: 2017-08-18 | End: 2017-08-18 | Stop reason: HOSPADM

## 2017-08-18 RX ORDER — HYDROMORPHONE HYDROCHLORIDE 2 MG/1
2 TABLET ORAL
Status: DISCONTINUED | OUTPATIENT
Start: 2017-08-18 | End: 2017-08-18 | Stop reason: HOSPADM

## 2017-08-18 RX ORDER — PROMETHAZINE HYDROCHLORIDE 25 MG/1
25 TABLET ORAL
Status: DISCONTINUED | OUTPATIENT
Start: 2017-08-18 | End: 2017-08-18 | Stop reason: HOSPADM

## 2017-08-18 RX ORDER — HYDROMORPHONE HYDROCHLORIDE 2 MG/1
2 TABLET ORAL
Qty: 30 TAB | Refills: 0 | Status: SHIPPED | OUTPATIENT
Start: 2017-08-18

## 2017-08-18 RX ORDER — HYDROMORPHONE HYDROCHLORIDE 2 MG/ML
2 INJECTION, SOLUTION INTRAMUSCULAR; INTRAVENOUS; SUBCUTANEOUS
Status: DISCONTINUED | OUTPATIENT
Start: 2017-08-18 | End: 2017-08-18

## 2017-08-18 RX ADMIN — DIPHENHYDRAMINE HYDROCHLORIDE 50 MG: 50 INJECTION, SOLUTION INTRAMUSCULAR; INTRAVENOUS at 00:02

## 2017-08-18 RX ADMIN — DIPHENHYDRAMINE HYDROCHLORIDE 50 MG: 25 CAPSULE ORAL at 10:35

## 2017-08-18 RX ADMIN — ONDANSETRON 4 MG: 2 INJECTION INTRAMUSCULAR; INTRAVENOUS at 00:02

## 2017-08-18 RX ADMIN — PANTOPRAZOLE SODIUM 40 MG: 40 TABLET, DELAYED RELEASE ORAL at 07:39

## 2017-08-18 RX ADMIN — MEDROXYPROGESTERONE ACETATE 10 MG: 10 TABLET ORAL at 10:02

## 2017-08-18 RX ADMIN — LEVOTHYROXINE SODIUM 100 MCG: 100 TABLET ORAL at 07:39

## 2017-08-18 RX ADMIN — CEFOXITIN SODIUM 1 G: 1 POWDER, FOR SOLUTION INTRAVENOUS at 04:51

## 2017-08-18 RX ADMIN — ONDANSETRON 4 MG: 2 INJECTION INTRAMUSCULAR; INTRAVENOUS at 04:50

## 2017-08-18 RX ADMIN — Medication 10 ML: at 05:00

## 2017-08-18 RX ADMIN — HYDROMORPHONE HYDROCHLORIDE 1 MG: 1 INJECTION, SOLUTION INTRAMUSCULAR; INTRAVENOUS; SUBCUTANEOUS at 04:50

## 2017-08-18 RX ADMIN — UMECLIDINIUM 1 PUFF: 62.5 AEROSOL, POWDER ORAL at 10:01

## 2017-08-18 RX ADMIN — HYDROMORPHONE HYDROCHLORIDE 2 MG: 2 TABLET ORAL at 10:01

## 2017-08-18 RX ADMIN — PROMETHAZINE HYDROCHLORIDE 25 MG: 25 TABLET ORAL at 10:01

## 2017-08-18 RX ADMIN — DIPHENHYDRAMINE HYDROCHLORIDE 50 MG: 50 INJECTION, SOLUTION INTRAMUSCULAR; INTRAVENOUS at 04:50

## 2017-08-18 RX ADMIN — OXYCODONE HYDROCHLORIDE 10 MG: 5 TABLET ORAL at 00:03

## 2017-08-18 NOTE — DISCHARGE INSTRUCTIONS
Patient Discharge Instructions    Arianna Repress / [id number] : 1975    Admitted 2017 Discharged: 2017     Take Home Medications       · It is important that you take the medication exactly as they are prescribed. · Keep your medication in the bottles provided by the pharmacist and keep a list of the medication names, dosages, and times to be taken in your wallet. · Do not take other medications without consulting your doctor. · Do not drive, drink alcohol, or operate machinery when taking sedating pain medication. · Take colace daily while on pain medication to help prevent constipation. You may take over the counter laxatives such as Dulcolax or Miralax as needed for constipation      What to do at Home    Recommended diet: Regular Diet    Recommended activity: Activity as tolerated. You may shower. You may drive once pain has improved and you no longer require pain medication. Follow up with Dr. Kevin Becerra in 2 weeks. Call [facility] to make an appointment. Call Dr. Kevin Becerra or go to the ER if you develop worsening pain, fever, vomiting, or any other symptoms that concern you.

## 2017-08-18 NOTE — PROGRESS NOTES
General Surgery Daily Progress Note    Patient: Mitesh Silva MRN: [id number]  SSN: [id number]    YOB: 1975  Age: 38 y.o. Sex: female      Admit Date: 8/16/2017    Subjective:   Pain controlled with [place] but reports it makes her feel anxious. Tolerating diet, + flatus and BM    Current Facility-Administered Medications   Medication Dose Route Frequency    promethazine (PHENERGAN) tablet 25 mg  25 mg Oral Q6H PRN    HYDROmorphone (DILAUDID) tablet 2 mg  2 mg Oral Q4H PRN    HYDROmorphone (PF) (DILAUDID) injection 1 mg  1 mg IntraVENous Q2H PRN    sodium chloride (NS) flush 5-10 mL  5-10 mL IntraVENous Q8H    sodium chloride (NS) flush 5-10 mL  5-10 mL IntraVENous PRN    diphenhydrAMINE (BENADRYL) injection 50 mg  50 mg IntraVENous Q4H PRN    ALPRAZolam (XANAX) tablet 0.5 mg  0.5 mg Oral QHS PRN    hydroCHLOROthiazide (HYDRODIURIL) tablet 25 mg  25 mg Oral DAILY    levothyroxine (SYNTHROID) tablet 100 mcg  100 mcg Oral ACB    lisinopril (PRINIVIL, ZESTRIL) tablet 20 mg  20 mg Oral DAILY    medroxyPROGESTERone (PROVERA) tablet 10 mg  10 mg Oral DAILY    metFORMIN ER (GLUCOPHAGE XR) tablet 1,000 mg  1,000 mg Oral BID    pantoprazole (PROTONIX) tablet 40 mg  40 mg Oral ACB    umeclidinium (INCRUSE ELLIPTA) 62.5 mcg/actuation  1 Puff Inhalation DAILY    sodium chloride (NS) flush 5-10 mL  5-10 mL IntraVENous Q8H    sodium chloride (NS) flush 5-10 mL  5-10 mL IntraVENous PRN    naloxone (NARCAN) injection 0.4 mg  0.4 mg IntraVENous PRN        Objective:      08/16 1901 - 08/18 0700  In: 2947.5 [P.O.:360; I.V.:2587.5]  Out: 304 [WOYTI:456]  Patient Vitals for the past 8 hrs:   BP Temp Pulse Resp SpO2   08/18/17 0913 123/67 98.8 °F (37.1 °C) - 16 99 %   08/18/17 0435 99/56 99.2 °F (37.3 °C) 89 16 97 %   08/18/17 0119 97/60 99.1 °F (37.3 °C) 92 18 98 %       Physical Exam:  General: Alert, cooperative, NAD  Lungs: Unlabored  Heart:  Regular rate and rhythm  Abdomen: Soft, ATTP, non-distended.  + bowel sounds.  Incisions c/d/i   Extremities: Warm, moves all, no edema  Skin:  Warm and dry, no rash    Labs:   Recent Labs      08/16/17   1807   WBC  8.8   HGB  14.3   HCT  39.2   PLT  373     Recent Labs      08/16/17   1807   NA  132*   K  4.1   CL  96*   CO2  25   GLU  83   BUN  23*   CREA  1.06*   CA  9.9   ALB  3.3*   TBILI  0.3   SGOT  25   ALT  26       Assessment / Plan:   · POD#2 Diagnostic lap for pneumoperitoneum  · Abdominal exam appropriate  ·  Tolerating diet  · PO pain meds- switch to Dilaudid PO  · D/c today

## 2017-08-18 NOTE — PROGRESS NOTES
Discusses discharge instructions with patient/ She verbalized understanding. Copy given. One prescription given. JEVON Corbin.

## 2017-08-18 NOTE — PROGRESS NOTES
Bedside and Verbal shift change report given to Kelsea Vee (oncoming nurse) by Bhavna Encarnacion (offgoing nurse). Report included the following information SBAR, Intake/Output and Recent Results.

## 2017-08-21 NOTE — DISCHARGE SUMMARY
Surgery Discharge Summary     Patient ID:  Evie Kessler  [id number]  38 y.o.  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 Mery Bateman underwent diagnostic lap for pneumoperitoneum. Source of free air was not identified in surgery. Patient had an uneventful post-operative course and was discharged home POD2 in stable condition. Disposition: stable    Discharged Condition : stable    Instructions: Follow-up in office  in 2 weeks.               Signed:  KARSON Dangelo  8/21/2017  7:24 AM

## 2017-08-22 ENCOUNTER — PATIENT OUTREACH (OUTPATIENT)
Dept: OTHER | Age: 42
End: 2017-08-22

## 2017-08-22 NOTE — PROGRESS NOTES
Patient noted to be discharged on 8/18 by this CM. Left message on voicemail. Will attempt to contact again. Need to complete post-discharge assessment.

## 2017-08-23 ENCOUNTER — PATIENT OUTREACH (OUTPATIENT)
Dept: OTHER | Age: 42
End: 2017-08-23

## 2017-08-23 NOTE — PROGRESS NOTES
Follow-up call to pt post procedure, two pt identifier verified. Pt states she is recovering, states pain is improved, but finished last of dilaudid, pt states she will contact Dr. Mike Guillaume office for follow-up appt and to discuss pain medications. Pt denied any red flag symptoms, and will contact this CM for any needed assistance.

## 2017-08-29 NOTE — OP NOTES
[address] [place] [address], [address]   OP NOTE       Name:  Elodia Cantor   MR#:  [id number]   :  1975   Account #:  [de-identified]    Surgery Date:  2017   Date of Adm:  2017       PREOPERATIVE DIAGNOSIS: Pneumoperitoneum. POSTOPERATIVE DIAGNOSIS: Pneumoperitoneum of unknown   etiology. PROCEDURES PERFORMED: Diagnostic laparoscopy. ESTIMATED BLOOD LOSS: Minimal.     SPECIMENS REMOVED: none    ANESTHESIA: General.    ATTENDING SURGEON: Hilary Antonio MD    ASSISTANT: Martin Velez    COMPLICATIONS: None. DRAINS AND TUBES: None. PROSTHETIC DEVICES: None. INDICATIONS FOR PROCEDURE: This 80-year-old woman who had   come to the ER 2 times in 3 days with abdominal pain. The second   time she had come to the ER, CT had been done in the interim, which   showed a large amount of free air and she had a history of a cervical   biopsy approximately 5 days previous. The patient had denied any   other symptoms, except vomiting, and so she was brought to the   operating room with abdominal pain and the free air. DETAILS OF PROCEDURE: Informed consent was obtained. The   patient was brought to the operating room and laid supine on the   operating table. General anesthesia was induced. Preoperative   antibiotics had been given, and the patient's abdomen was prepped   and draped in sterile fashion. A time-out was performed. A small   vertical incision was made just below the umbilicus and taken down   through subcutaneous tissue. The fascia was grasped and incised. When the abdomen was entered, there was a small whoosh of air. The   Denny trocar was placed and pneumoperitoneum was again   achieved. The laparoscope was inserted. Additional trocars were   placed in the left upper quadrant and left lower quadrant. These were   done under direct vision. Once this was done, the bowel was grasped. It did not appear hyperemic.  There was no fibrinous exudate. There   was no purulent fluid throughout the abdomen. The entire abdomen   was surveyed. The bowel was run from the ligament of Treitz all the   way down to the colon. The appendix appeared normal. The   gallbladder appeared normal. There was no omental adhesion   anywhere. The stomach did not appear to have any anterior wall   gastric ulcers. Looking down in the pelvis, there was an ovarian cyst on   the left and endometriosis was noted in the pelvis as well. In actuality,   there were no other abnormalities and no perforation noted. There was   no succuss noted, and after a thorough surveillance of the abdomen,   we decided to place the patient on antibiotics and let her recover   because no abnormalities were found. The trocars were removed. The   fascia was closed with a 0 Vicryl suture, and 4-0 Monocryl was used to   close the skin of all the trocar sites. Dermabond was applied. The   patient was taken to the PACU in stable condition. She tolerated the   procedure well. There were no complications during the case. Sponge,   needle, and instrument counts were correct at the end of the case.         HEIDY EDMOND MD DC / GABINO   D:  08/29/2017   10:47   T:  08/29/2017   11:15   Job #:  [id number]

## 2017-09-12 ENCOUNTER — PATIENT OUTREACH (OUTPATIENT)
Dept: OTHER | Age: 42
End: 2017-09-12

## 2017-09-21 ENCOUNTER — PATIENT OUTREACH (OUTPATIENT)
Dept: OTHER | Age: 42
End: 2017-09-21

## 2017-09-21 NOTE — PROGRESS NOTES
Resolving current episode Transitions of care complete. No further ED/UC or hospital admissions within 30 days post discharge. Left discreet voicemail with contact information. No outreach from patient to [address] [place] [address].

## 2017-09-22 RX ORDER — OMEPRAZOLE 20 MG/1
20 CAPSULE, DELAYED RELEASE ORAL DAILY
Qty: 30 CAP | Refills: 6 | Status: SHIPPED | OUTPATIENT
Start: 2017-09-22

## 2017-11-10 RX ORDER — PANTOPRAZOLE SODIUM 40 MG/1
40 TABLET, DELAYED RELEASE ORAL DAILY
Qty: 30 TAB | Refills: 1 | Status: CANCELLED | OUTPATIENT
Start: 2017-11-10

## 2017-11-10 NOTE — TELEPHONE ENCOUNTER
Pharmacy requested alternative for omeprazole 20 mg. Verbal order given per Dr Bridget Resendiz to change Rx to Protonix 40mg daily.

## 2018-01-18 DIAGNOSIS — F51.01 PRIMARY INSOMNIA: ICD-10-CM

## 2018-01-19 RX ORDER — TRAZODONE HYDROCHLORIDE 50 MG/1
50 TABLET ORAL
Qty: 30 TAB | Refills: 3 | Status: SHIPPED | OUTPATIENT
Start: 2018-01-19 | End: 2018-06-19 | Stop reason: SDUPTHER

## 2018-01-19 RX ORDER — ALPRAZOLAM 0.5 MG/1
0.5 TABLET ORAL
Qty: 30 TAB | Refills: 3 | OUTPATIENT
Start: 2018-01-19 | End: 2018-06-26 | Stop reason: SDUPTHER

## 2018-02-01 DIAGNOSIS — I10 ESSENTIAL HYPERTENSION: ICD-10-CM

## 2018-02-01 RX ORDER — LISINOPRIL 20 MG/1
20 TABLET ORAL DAILY
Qty: 90 TAB | Refills: 1 | Status: SHIPPED | OUTPATIENT
Start: 2018-02-01

## 2018-02-02 LAB — CREATININE, EXTERNAL: 0.76

## 2018-06-19 DIAGNOSIS — F51.01 PRIMARY INSOMNIA: ICD-10-CM

## 2018-06-19 RX ORDER — TRAZODONE HYDROCHLORIDE 50 MG/1
50 TABLET ORAL
Qty: 30 TAB | Refills: 3 | Status: SHIPPED | OUTPATIENT
Start: 2018-06-19 | End: 2018-09-21 | Stop reason: SDUPTHER

## 2018-06-19 NOTE — TELEPHONE ENCOUNTER
Last Visit: 8/1/1704-Ysqucb-Efneig  Next Appt: None-pt cancelled 5/4  Previous Refill Encounter: 1/19/18-30+3    Requested Prescriptions     Pending Prescriptions Disp Refills    traZODone (DESYREL) 50 mg tablet 30 Tab 11     Sig: Take 1 Tab by mouth nightly.

## 2018-06-26 DIAGNOSIS — F51.01 PRIMARY INSOMNIA: ICD-10-CM

## 2018-06-26 RX ORDER — ALPRAZOLAM 0.5 MG/1
0.5 TABLET ORAL
Qty: 30 TAB | Refills: 1 | OUTPATIENT
Start: 2018-06-26 | End: 2018-08-29 | Stop reason: SDUPTHER

## 2018-06-26 NOTE — TELEPHONE ENCOUNTER
Print and check   \Last Visit: 8/1/1755-Ypoker-Tdfvky  Next Appt:None-Pt cancelled 5/4/18 appt  Previous Refill Encounter: 1/9/18-30+3 refills    Requested Prescriptions     Pending Prescriptions Disp Refills    ALPRAZolam (XANAX) 0.5 mg tablet 30 Tab 3     Sig: Take 1 Tab by mouth nightly as needed.  Max Daily Amount: 0.5 mg.

## 2018-06-27 NOTE — TELEPHONE ENCOUNTER
Xanax 0.5mg called into [facility].    Called patient and informed prior to any further refills will need an office visit. Offered to schedule appt but patient stated she has no insurance and will call back. Patient also stated she will be moving to [place] soon. Writer again informed patient will need office visit prior to any further refills.  Patient verbalized understanding

## 2018-08-03 DIAGNOSIS — I10 ESSENTIAL HYPERTENSION: ICD-10-CM

## 2018-08-03 NOTE — TELEPHONE ENCOUNTER
Last Visit: 8/1/1775-Skxngr-Kcqujl  Next Appt: None-Cancelled-May appt  Previous Refill Encounter: 8/1/17-90+3    Requested Prescriptions     Pending Prescriptions Disp Refills    hydroCHLOROthiazide (HYDRODIURIL) 25 mg tablet 30 Tab 0     Sig: Take 1 Tab by mouth daily.  (needs appt for further refills)

## 2018-08-06 RX ORDER — HYDROCHLOROTHIAZIDE 25 MG/1
25 TABLET ORAL DAILY
Qty: 30 TAB | Refills: 0 | Status: SHIPPED | OUTPATIENT
Start: 2018-08-06 | End: 2018-09-05 | Stop reason: SDUPTHER

## 2018-08-15 ENCOUNTER — TELEPHONE (OUTPATIENT)
Dept: FAMILY MEDICINE CLINIC | Age: 43
End: 2018-08-15

## 2018-08-15 NOTE — TELEPHONE ENCOUNTER
Called patient LM will need an office visit prior to any further refills. Please call back to schedule an appt.

## 2018-08-29 DIAGNOSIS — F51.01 PRIMARY INSOMNIA: ICD-10-CM

## 2018-08-29 NOTE — TELEPHONE ENCOUNTER
Xanax 0.5mg #30 with no refills called into [facility]     Called patient and Dr. Janusz Goodwin will refill for 30 tabs with no refills until visit 9/21/2018. Patient verbalized understanding.

## 2018-08-30 RX ORDER — ALPRAZOLAM 0.5 MG/1
0.5 TABLET ORAL
Qty: 30 TAB | Refills: 0 | OUTPATIENT
Start: 2018-08-30

## 2018-09-05 DIAGNOSIS — I10 ESSENTIAL HYPERTENSION: ICD-10-CM

## 2018-09-05 RX ORDER — HYDROCHLOROTHIAZIDE 25 MG/1
25 TABLET ORAL DAILY
Qty: 30 TAB | Refills: 0 | Status: SHIPPED | OUTPATIENT
Start: 2018-09-05 | End: 2018-10-03 | Stop reason: SDUPTHER

## 2018-09-05 NOTE — TELEPHONE ENCOUNTER
Last Visit: 8/1/1761-Gdtuki-Tzfska  Next Appt: 9/2149-Wbfmtb-Uiaqux  Previous Refill Encounter: 8/5-30+0    Requested Prescriptions     Pending Prescriptions Disp Refills    hydroCHLOROthiazide (HYDRODIURIL) 25 mg tablet 30 Tab 0     Sig: Take 1 Tab by mouth daily.  (needs appt for further refills)

## 2018-09-21 DIAGNOSIS — F51.01 PRIMARY INSOMNIA: ICD-10-CM

## 2018-09-21 RX ORDER — TRAZODONE HYDROCHLORIDE 50 MG/1
50 TABLET ORAL
Qty: 90 TAB | Refills: 0 | Status: SHIPPED | OUTPATIENT
Start: 2018-09-21

## 2018-09-21 NOTE — TELEPHONE ENCOUNTER
Last Visit: 8/1/1724-Ocijmd-Styptk  Next Appt: 10/53-Gxiffc-Zrikfv  Previous Refill Encounter: 6-18-30+3    Requested Prescriptions     Pending Prescriptions Disp Refills    traZODone (DESYREL) 50 mg tablet 90 Tab 0     Sig: Take 1 Tab by mouth nightly.

## 2018-10-03 DIAGNOSIS — I10 ESSENTIAL HYPERTENSION: ICD-10-CM

## 2018-10-03 RX ORDER — HYDROCHLOROTHIAZIDE 25 MG/1
25 TABLET ORAL DAILY
Qty: 30 TAB | Refills: 0 | Status: SHIPPED | OUTPATIENT
Start: 2018-10-03

## 2018-10-03 NOTE — TELEPHONE ENCOUNTER
Last Visit: 8/1/17  Next Appt: 10/30  Previous Refill Encounter: 30+0    Requested Prescriptions     Pending Prescriptions Disp Refills    hydroCHLOROthiazide (HYDRODIURIL) 25 mg tablet 30 Tab 0     Sig: Take 1 Tab by mouth daily.  (needs appt for further refills)

## 2018-12-19 DIAGNOSIS — F51.01 PRIMARY INSOMNIA: ICD-10-CM

## 2018-12-19 RX ORDER — TRAZODONE HYDROCHLORIDE 50 MG/1
50 TABLET ORAL
Qty: 90 TAB | Refills: 0 | OUTPATIENT
Start: 2018-12-19

## 2018-12-19 NOTE — TELEPHONE ENCOUNTER
Last Visit: 8/1/17  Next Appt: No Show-10/30  Previous Refill Encounter: 9/21-90+0    Requested Prescriptions     Pending Prescriptions Disp Refills    traZODone (DESYREL) 50 mg tablet 90 Tab 0     Sig: Take 1 Tab by mouth nightly.  (needs appt for further refills)

## 2021-08-03 PROBLEM — J45.909 ASTHMA: Status: RESOLVED | Noted: 2021-08-03 | Resolved: 2021-08-03

## (undated) DEVICE — SOL IRRIGATION INJ NACL 0.9% 500ML BTL

## (undated) DEVICE — CLICKLINE SCISSORS INSERT: Brand: CLICKLINE

## (undated) DEVICE — KENDALL SCD EXPRESS SLEEVES, KNEE LENGTH, MEDIUM: Brand: KENDALL SCD

## (undated) DEVICE — (D)SYR 10ML 1/5ML GRAD NSAF -- PKGING CHANGE USE ITEM 338027

## (undated) DEVICE — STERILE POLYISOPRENE POWDER-FREE SURGICAL GLOVES: Brand: PROTEXIS

## (undated) DEVICE — SURGICAL PROCEDURE KIT GEN LAPAROSCOPY LF

## (undated) DEVICE — LAPAROSCOPIC WIRE L-HOOK ELECTRODE COATED: Brand: CLEANCOAT

## (undated) DEVICE — BLADELESS OPTICAL TROCAR WITH FIXATION CANNULA: Brand: VERSAPORT

## (undated) DEVICE — DEVON™ KNEE AND BODY STRAP 60" X 3" (1.5 M X 7.6 CM): Brand: DEVON

## (undated) DEVICE — DERMABOND SKIN ADH 0.7ML -- DERMABOND ADVANCED 12/BX

## (undated) DEVICE — TUBING INSUFLTN 10FT LUER -- CONVERT TO ITEM 368568

## (undated) DEVICE — NEEDLE HYPO 22GA L1.5IN BLK S STL HUB POLYPR SHLD REG BVL

## (undated) DEVICE — (D)PREP SKN CHLRAPRP APPL 26ML -- CONVERT TO ITEM 371833

## (undated) DEVICE — UNIVERSAL FIXATION CANNULA: Brand: VERSAONE

## (undated) DEVICE — TRAY CATH OD16FR SIL URIN M STATLOK STBL DEV SURSTP

## (undated) DEVICE — COVER LT HNDL BLU PLAS

## (undated) DEVICE — SOLUTION IRRIGATION NACL 0.9% 1000 ML FLX CONTAINER

## (undated) DEVICE — SUTURE SZ 0 27IN 5/8 CIR UR-6  TAPER PT VIOLET ABSRB VICRYL J603H

## (undated) DEVICE — DEVICE TRNSF SPIK STL 2008S] MICROTEK MEDICAL INC]

## (undated) DEVICE — DRAPE,REIN 53X77,STERILE: Brand: MEDLINE

## (undated) DEVICE — TOWEL SURG W17XL27IN STD BLU COT NONFENESTRATED PREWASHED

## (undated) DEVICE — INFECTION CONTROL KIT SYS

## (undated) DEVICE — 3000CC GUARDIAN II: Brand: GUARDIAN

## (undated) DEVICE — REM POLYHESIVE ADULT PATIENT RETURN ELECTRODE: Brand: VALLEYLAB